# Patient Record
Sex: FEMALE | Race: WHITE | NOT HISPANIC OR LATINO | Employment: FULL TIME | URBAN - METROPOLITAN AREA
[De-identification: names, ages, dates, MRNs, and addresses within clinical notes are randomized per-mention and may not be internally consistent; named-entity substitution may affect disease eponyms.]

---

## 2019-08-02 ENCOUNTER — LAB (OUTPATIENT)
Dept: LAB | Facility: HOSPITAL | Age: 39
End: 2019-08-02

## 2019-08-02 ENCOUNTER — TRANSCRIBE ORDERS (OUTPATIENT)
Dept: LAB | Facility: HOSPITAL | Age: 39
End: 2019-08-02

## 2019-08-02 DIAGNOSIS — Z34.80 PRENATAL CARE OF MULTIGRAVIDA, ANTEPARTUM: ICD-10-CM

## 2019-08-02 DIAGNOSIS — Z34.81 PRENATAL CARE, SUBSEQUENT PREGNANCY, FIRST TRIMESTER: Primary | ICD-10-CM

## 2019-08-02 DIAGNOSIS — Z34.81 PRENATAL CARE, SUBSEQUENT PREGNANCY, FIRST TRIMESTER: ICD-10-CM

## 2019-08-02 LAB
ABO GROUP BLD: NORMAL
BASOPHILS # BLD AUTO: 0.03 10*3/MM3 (ref 0–0.2)
BASOPHILS NFR BLD AUTO: 0.4 % (ref 0–1.5)
BLD GP AB SCN SERPL QL: NEGATIVE
DEPRECATED RDW RBC AUTO: 42.6 FL (ref 37–54)
EOSINOPHIL # BLD AUTO: 0.04 10*3/MM3 (ref 0–0.4)
EOSINOPHIL NFR BLD AUTO: 0.5 % (ref 0.3–6.2)
ERYTHROCYTE [DISTWIDTH] IN BLOOD BY AUTOMATED COUNT: 12.9 % (ref 12.3–15.4)
GLUCOSE BLD-MCNC: 75 MG/DL (ref 65–99)
HBV SURFACE AG SERPL QL IA: NORMAL
HCT VFR BLD AUTO: 42.6 % (ref 34–46.6)
HCV AB SER DONR QL: NORMAL
HGB BLD-MCNC: 13.7 G/DL (ref 12–15.9)
HIV1+2 AB SER QL: NORMAL
IMM GRANULOCYTES # BLD AUTO: 0.02 10*3/MM3 (ref 0–0.05)
IMM GRANULOCYTES NFR BLD AUTO: 0.3 % (ref 0–0.5)
LYMPHOCYTES # BLD AUTO: 1.66 10*3/MM3 (ref 0.7–3.1)
LYMPHOCYTES NFR BLD AUTO: 21.4 % (ref 19.6–45.3)
MCH RBC QN AUTO: 29.2 PG (ref 26.6–33)
MCHC RBC AUTO-ENTMCNC: 32.2 G/DL (ref 31.5–35.7)
MCV RBC AUTO: 90.8 FL (ref 79–97)
MONOCYTES # BLD AUTO: 0.41 10*3/MM3 (ref 0.1–0.9)
MONOCYTES NFR BLD AUTO: 5.3 % (ref 5–12)
NEUTROPHILS # BLD AUTO: 5.59 10*3/MM3 (ref 1.7–7)
NEUTROPHILS NFR BLD AUTO: 72.1 % (ref 42.7–76)
NRBC BLD AUTO-RTO: 0 /100 WBC (ref 0–0.2)
PLATELET # BLD AUTO: 280 10*3/MM3 (ref 140–450)
PMV BLD AUTO: 10.8 FL (ref 6–12)
RBC # BLD AUTO: 4.69 10*6/MM3 (ref 3.77–5.28)
RH BLD: POSITIVE
RPR SER QL: NORMAL
TSH SERPL DL<=0.05 MIU/L-ACNC: 2.29 MIU/ML (ref 0.27–4.2)
WBC NRBC COR # BLD: 7.75 10*3/MM3 (ref 3.4–10.8)

## 2019-08-02 PROCEDURE — 86901 BLOOD TYPING SEROLOGIC RH(D): CPT

## 2019-08-02 PROCEDURE — 85025 COMPLETE CBC W/AUTO DIFF WBC: CPT

## 2019-08-02 PROCEDURE — 84443 ASSAY THYROID STIM HORMONE: CPT

## 2019-08-02 PROCEDURE — 80081 OBSTETRIC PANEL INC HIV TSTG: CPT

## 2019-08-02 PROCEDURE — 86803 HEPATITIS C AB TEST: CPT

## 2019-08-02 PROCEDURE — 82947 ASSAY GLUCOSE BLOOD QUANT: CPT

## 2019-08-02 PROCEDURE — 87340 HEPATITIS B SURFACE AG IA: CPT

## 2019-08-02 PROCEDURE — 86900 BLOOD TYPING SEROLOGIC ABO: CPT

## 2019-08-02 PROCEDURE — 36415 COLL VENOUS BLD VENIPUNCTURE: CPT

## 2019-08-02 PROCEDURE — G0432 EIA HIV-1/HIV-2 SCREEN: HCPCS

## 2019-08-02 PROCEDURE — 86850 RBC ANTIBODY SCREEN: CPT

## 2019-08-03 LAB — RUBV IGG SERPL IA-ACNC: POSITIVE

## 2019-12-23 ENCOUNTER — TRANSCRIBE ORDERS (OUTPATIENT)
Dept: LAB | Facility: HOSPITAL | Age: 39
End: 2019-12-23

## 2019-12-23 ENCOUNTER — LAB (OUTPATIENT)
Dept: LAB | Facility: HOSPITAL | Age: 39
End: 2019-12-23

## 2019-12-23 DIAGNOSIS — Z34.83 PRENATAL CARE, SUBSEQUENT PREGNANCY, THIRD TRIMESTER: ICD-10-CM

## 2019-12-23 DIAGNOSIS — Z3A.28 28 WEEKS GESTATION OF PREGNANCY: ICD-10-CM

## 2019-12-23 DIAGNOSIS — Z3A.28 28 WEEKS GESTATION OF PREGNANCY: Primary | ICD-10-CM

## 2019-12-23 LAB
BLD GP AB SCN SERPL QL: NEGATIVE
DEPRECATED RDW RBC AUTO: 39.1 FL (ref 37–54)
ERYTHROCYTE [DISTWIDTH] IN BLOOD BY AUTOMATED COUNT: 12.4 % (ref 12.3–15.4)
GLUCOSE 1H P 100 G GLC PO SERPL-MCNC: 91 MG/DL (ref 65–140)
HCT VFR BLD AUTO: 33.7 % (ref 34–46.6)
HGB BLD-MCNC: 11.3 G/DL (ref 12–15.9)
MCH RBC QN AUTO: 29.3 PG (ref 26.6–33)
MCHC RBC AUTO-ENTMCNC: 33.5 G/DL (ref 31.5–35.7)
MCV RBC AUTO: 87.3 FL (ref 79–97)
PLATELET # BLD AUTO: 231 10*3/MM3 (ref 140–450)
PMV BLD AUTO: 10.2 FL (ref 6–12)
RBC # BLD AUTO: 3.86 10*6/MM3 (ref 3.77–5.28)
WBC NRBC COR # BLD: 9.04 10*3/MM3 (ref 3.4–10.8)

## 2019-12-23 PROCEDURE — 85027 COMPLETE CBC AUTOMATED: CPT

## 2019-12-23 PROCEDURE — 86850 RBC ANTIBODY SCREEN: CPT

## 2019-12-23 PROCEDURE — 36415 COLL VENOUS BLD VENIPUNCTURE: CPT

## 2019-12-23 PROCEDURE — 82950 GLUCOSE TEST: CPT

## 2020-01-16 ENCOUNTER — HOSPITAL ENCOUNTER (OUTPATIENT)
Facility: HOSPITAL | Age: 40
Discharge: HOME | End: 2020-01-16
Attending: ADVANCED PRACTICE MIDWIFE | Admitting: OBSTETRICS & GYNECOLOGY

## 2020-01-16 VITALS
HEART RATE: 88 BPM | SYSTOLIC BLOOD PRESSURE: 126 MMHG | RESPIRATION RATE: 18 BRPM | DIASTOLIC BLOOD PRESSURE: 69 MMHG | WEIGHT: 227 LBS | BODY MASS INDEX: 32.5 KG/M2 | TEMPERATURE: 98.1 F | HEIGHT: 70 IN

## 2020-01-16 PROCEDURE — 99203 OFFICE O/P NEW LOW 30 MIN: CPT | Performed by: OBSTETRICS & GYNECOLOGY

## 2020-01-16 PROCEDURE — G0463 HOSPITAL OUTPT CLINIC VISIT: HCPCS

## 2020-01-16 PROCEDURE — 59025 FETAL NON-STRESS TEST: CPT
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
## 2020-03-12 ENCOUNTER — ANESTHESIA (OUTPATIENT)
Dept: LABOR AND DELIVERY | Facility: HOSPITAL | Age: 40
End: 2020-03-12

## 2020-03-12 ENCOUNTER — ANESTHESIA EVENT (OUTPATIENT)
Dept: LABOR AND DELIVERY | Facility: HOSPITAL | Age: 40
End: 2020-03-12

## 2020-03-12 ENCOUNTER — HOSPITAL ENCOUNTER (INPATIENT)
Facility: HOSPITAL | Age: 40
LOS: 2 days | Discharge: HOME | End: 2020-03-14
Attending: ADVANCED PRACTICE MIDWIFE | Admitting: OBSTETRICS & GYNECOLOGY

## 2020-03-12 PROBLEM — O36.8930 MECONIUM IN AMNIOTIC FLUID AFFECTING MANAGEMENT OF MOTHER IN THIRD TRIMESTER: Status: ACTIVE | Noted: 2020-03-12

## 2020-03-12 LAB
ABO GROUP BLD: NORMAL
BASOPHILS # BLD AUTO: 0.04 10*3/MM3 (ref 0–0.2)
BASOPHILS NFR BLD AUTO: 0.2 % (ref 0–1.5)
BLD GP AB SCN SERPL QL: NEGATIVE
DEPRECATED RDW RBC AUTO: 42.9 FL (ref 37–54)
EOSINOPHIL # BLD AUTO: 0.01 10*3/MM3 (ref 0–0.4)
EOSINOPHIL NFR BLD AUTO: 0 % (ref 0.3–6.2)
ERYTHROCYTE [DISTWIDTH] IN BLOOD BY AUTOMATED COUNT: 13.6 % (ref 12.3–15.4)
HCT VFR BLD AUTO: 37.1 % (ref 34–46.6)
HGB BLD-MCNC: 12.2 G/DL (ref 12–15.9)
IMM GRANULOCYTES # BLD AUTO: 0.12 10*3/MM3 (ref 0–0.05)
IMM GRANULOCYTES NFR BLD AUTO: 0.6 % (ref 0–0.5)
LYMPHOCYTES # BLD AUTO: 1.64 10*3/MM3 (ref 0.7–3.1)
LYMPHOCYTES NFR BLD AUTO: 7.9 % (ref 19.6–45.3)
MCH RBC QN AUTO: 28.8 PG (ref 26.6–33)
MCHC RBC AUTO-ENTMCNC: 32.9 G/DL (ref 31.5–35.7)
MCV RBC AUTO: 87.7 FL (ref 79–97)
MONOCYTES # BLD AUTO: 1.13 10*3/MM3 (ref 0.1–0.9)
MONOCYTES NFR BLD AUTO: 5.5 % (ref 5–12)
NEUTROPHILS # BLD AUTO: 17.71 10*3/MM3 (ref 1.7–7)
NEUTROPHILS NFR BLD AUTO: 85.8 % (ref 42.7–76)
NRBC BLD AUTO-RTO: 0 /100 WBC (ref 0–0.2)
PLATELET # BLD AUTO: 244 10*3/MM3 (ref 140–450)
PMV BLD AUTO: 10.9 FL (ref 6–12)
RBC # BLD AUTO: 4.23 10*6/MM3 (ref 3.77–5.28)
RH BLD: POSITIVE
T&S EXPIRATION DATE: NORMAL
WBC NRBC COR # BLD: 20.65 10*3/MM3 (ref 3.4–10.8)

## 2020-03-12 PROCEDURE — 25010000002 ROPIVACAINE PER 1 MG: Performed by: ANESTHESIOLOGY

## 2020-03-12 PROCEDURE — C1755 CATHETER, INTRASPINAL: HCPCS | Performed by: ANESTHESIOLOGY

## 2020-03-12 PROCEDURE — 0KQM0ZZ REPAIR PERINEUM MUSCLE, OPEN APPROACH: ICD-10-PCS | Performed by: ADVANCED PRACTICE MIDWIFE

## 2020-03-12 PROCEDURE — 25010000002 FENTANYL CITRATE (PF) 100 MCG/2ML SOLUTION: Performed by: ANESTHESIOLOGY

## 2020-03-12 PROCEDURE — 25010000002 ONDANSETRON PER 1 MG: Performed by: OBSTETRICS & GYNECOLOGY

## 2020-03-12 PROCEDURE — 86850 RBC ANTIBODY SCREEN: CPT | Performed by: OBSTETRICS & GYNECOLOGY

## 2020-03-12 PROCEDURE — 36415 COLL VENOUS BLD VENIPUNCTURE: CPT | Performed by: OBSTETRICS & GYNECOLOGY

## 2020-03-12 PROCEDURE — C1755 CATHETER, INTRASPINAL: HCPCS

## 2020-03-12 PROCEDURE — 85025 COMPLETE CBC W/AUTO DIFF WBC: CPT | Performed by: OBSTETRICS & GYNECOLOGY

## 2020-03-12 PROCEDURE — 86900 BLOOD TYPING SEROLOGIC ABO: CPT | Performed by: OBSTETRICS & GYNECOLOGY

## 2020-03-12 PROCEDURE — 59025 FETAL NON-STRESS TEST: CPT

## 2020-03-12 PROCEDURE — 86901 BLOOD TYPING SEROLOGIC RH(D): CPT | Performed by: OBSTETRICS & GYNECOLOGY

## 2020-03-12 RX ORDER — OXYTOCIN 10 [USP'U]/ML
10 INJECTION, SOLUTION INTRAMUSCULAR; INTRAVENOUS ONCE
Status: COMPLETED | OUTPATIENT
Start: 2020-03-12 | End: 2020-03-12

## 2020-03-12 RX ORDER — LIDOCAINE HYDROCHLORIDE AND EPINEPHRINE BITARTRATE 20; .01 MG/ML; MG/ML
INJECTION, SOLUTION SUBCUTANEOUS AS NEEDED
Status: DISCONTINUED | OUTPATIENT
Start: 2020-03-12 | End: 2020-03-12 | Stop reason: SURG

## 2020-03-12 RX ORDER — DOCUSATE SODIUM 100 MG/1
100 CAPSULE, LIQUID FILLED ORAL 2 TIMES DAILY PRN
Status: DISCONTINUED | OUTPATIENT
Start: 2020-03-12 | End: 2020-03-14 | Stop reason: HOSPADM

## 2020-03-12 RX ORDER — SODIUM CHLORIDE 0.9 % (FLUSH) 0.9 %
10 SYRINGE (ML) INJECTION AS NEEDED
Status: DISCONTINUED | OUTPATIENT
Start: 2020-03-12 | End: 2020-03-12 | Stop reason: HOSPADM

## 2020-03-12 RX ORDER — PROMETHAZINE HYDROCHLORIDE 25 MG/ML
12.5 INJECTION, SOLUTION INTRAMUSCULAR; INTRAVENOUS EVERY 6 HOURS PRN
Status: DISCONTINUED | OUTPATIENT
Start: 2020-03-12 | End: 2020-03-12 | Stop reason: HOSPADM

## 2020-03-12 RX ORDER — MAGNESIUM CARB/ALUMINUM HYDROX 105-160MG
30 TABLET,CHEWABLE ORAL ONCE
Status: DISCONTINUED | OUTPATIENT
Start: 2020-03-12 | End: 2020-03-12 | Stop reason: HOSPADM

## 2020-03-12 RX ORDER — ACETAMINOPHEN 325 MG/1
650 TABLET ORAL EVERY 4 HOURS PRN
Status: DISCONTINUED | OUTPATIENT
Start: 2020-03-12 | End: 2020-03-12 | Stop reason: HOSPADM

## 2020-03-12 RX ORDER — SODIUM CHLORIDE 0.9 % (FLUSH) 0.9 %
3 SYRINGE (ML) INJECTION EVERY 12 HOURS SCHEDULED
Status: DISCONTINUED | OUTPATIENT
Start: 2020-03-12 | End: 2020-03-12 | Stop reason: HOSPADM

## 2020-03-12 RX ORDER — OXYTOCIN-SODIUM CHLORIDE 0.9% IV SOLN 30 UNIT/500ML 30-0.9/5 UT/ML-%
85 SOLUTION INTRAVENOUS ONCE
Status: DISCONTINUED | OUTPATIENT
Start: 2020-03-12 | End: 2020-03-12 | Stop reason: HOSPADM

## 2020-03-12 RX ORDER — ONDANSETRON 2 MG/ML
4 INJECTION INTRAMUSCULAR; INTRAVENOUS EVERY 6 HOURS PRN
Status: DISCONTINUED | OUTPATIENT
Start: 2020-03-12 | End: 2020-03-14 | Stop reason: HOSPADM

## 2020-03-12 RX ORDER — MORPHINE SULFATE 2 MG/ML
2 INJECTION, SOLUTION INTRAMUSCULAR; INTRAVENOUS
Status: DISCONTINUED | OUTPATIENT
Start: 2020-03-12 | End: 2020-03-12 | Stop reason: HOSPADM

## 2020-03-12 RX ORDER — DIPHENHYDRAMINE HYDROCHLORIDE 50 MG/ML
12.5 INJECTION INTRAMUSCULAR; INTRAVENOUS EVERY 8 HOURS PRN
Status: DISCONTINUED | OUTPATIENT
Start: 2020-03-12 | End: 2020-03-12 | Stop reason: HOSPADM

## 2020-03-12 RX ORDER — PROMETHAZINE HYDROCHLORIDE 12.5 MG/1
12.5 TABLET ORAL EVERY 6 HOURS PRN
Status: DISCONTINUED | OUTPATIENT
Start: 2020-03-12 | End: 2020-03-12 | Stop reason: HOSPADM

## 2020-03-12 RX ORDER — ROPIVACAINE HYDROCHLORIDE 5 MG/ML
INJECTION, SOLUTION EPIDURAL; INFILTRATION; PERINEURAL AS NEEDED
Status: DISCONTINUED | OUTPATIENT
Start: 2020-03-12 | End: 2020-03-12 | Stop reason: SURG

## 2020-03-12 RX ORDER — LANOLIN
CREAM (ML) TOPICAL
Status: DISCONTINUED | OUTPATIENT
Start: 2020-03-12 | End: 2020-03-14 | Stop reason: HOSPADM

## 2020-03-12 RX ORDER — FAMOTIDINE 10 MG/ML
20 INJECTION, SOLUTION INTRAVENOUS ONCE AS NEEDED
Status: DISCONTINUED | OUTPATIENT
Start: 2020-03-12 | End: 2020-03-12 | Stop reason: HOSPADM

## 2020-03-12 RX ORDER — SODIUM CHLORIDE 0.9 % (FLUSH) 0.9 %
1-10 SYRINGE (ML) INJECTION AS NEEDED
Status: DISCONTINUED | OUTPATIENT
Start: 2020-03-12 | End: 2020-03-14 | Stop reason: HOSPADM

## 2020-03-12 RX ORDER — PROMETHAZINE HYDROCHLORIDE 12.5 MG/1
12.5 SUPPOSITORY RECTAL EVERY 6 HOURS PRN
Status: DISCONTINUED | OUTPATIENT
Start: 2020-03-12 | End: 2020-03-12 | Stop reason: HOSPADM

## 2020-03-12 RX ORDER — OXYCODONE HYDROCHLORIDE AND ACETAMINOPHEN 5; 325 MG/1; MG/1
2 TABLET ORAL EVERY 4 HOURS PRN
Status: DISCONTINUED | OUTPATIENT
Start: 2020-03-12 | End: 2020-03-12 | Stop reason: HOSPADM

## 2020-03-12 RX ORDER — OXYTOCIN-SODIUM CHLORIDE 0.9% IV SOLN 30 UNIT/500ML 30-0.9/5 UT/ML-%
650 SOLUTION INTRAVENOUS ONCE
Status: DISCONTINUED | OUTPATIENT
Start: 2020-03-12 | End: 2020-03-12 | Stop reason: HOSPADM

## 2020-03-12 RX ORDER — FENTANYL CITRATE 50 UG/ML
INJECTION, SOLUTION INTRAMUSCULAR; INTRAVENOUS AS NEEDED
Status: DISCONTINUED | OUTPATIENT
Start: 2020-03-12 | End: 2020-03-12 | Stop reason: SURG

## 2020-03-12 RX ORDER — LIDOCAINE HYDROCHLORIDE AND EPINEPHRINE 15; 5 MG/ML; UG/ML
INJECTION, SOLUTION EPIDURAL AS NEEDED
Status: DISCONTINUED | OUTPATIENT
Start: 2020-03-12 | End: 2020-03-12 | Stop reason: SURG

## 2020-03-12 RX ORDER — ONDANSETRON 2 MG/ML
4 INJECTION INTRAMUSCULAR; INTRAVENOUS ONCE AS NEEDED
Status: DISCONTINUED | OUTPATIENT
Start: 2020-03-12 | End: 2020-03-12 | Stop reason: HOSPADM

## 2020-03-12 RX ORDER — ROPIVACAINE HYDROCHLORIDE 2 MG/ML
15 INJECTION, SOLUTION EPIDURAL; INFILTRATION; PERINEURAL CONTINUOUS
Status: DISCONTINUED | OUTPATIENT
Start: 2020-03-12 | End: 2020-03-14 | Stop reason: HOSPADM

## 2020-03-12 RX ORDER — FAMOTIDINE 20 MG/1
20 TABLET, FILM COATED ORAL 2 TIMES DAILY PRN
Status: DISCONTINUED | OUTPATIENT
Start: 2020-03-12 | End: 2020-03-14 | Stop reason: HOSPADM

## 2020-03-12 RX ORDER — METOCLOPRAMIDE HYDROCHLORIDE 5 MG/ML
10 INJECTION INTRAMUSCULAR; INTRAVENOUS ONCE AS NEEDED
Status: DISCONTINUED | OUTPATIENT
Start: 2020-03-12 | End: 2020-03-12

## 2020-03-12 RX ORDER — IBUPROFEN 600 MG/1
600 TABLET ORAL EVERY 6 HOURS PRN
Status: DISCONTINUED | OUTPATIENT
Start: 2020-03-12 | End: 2020-03-14 | Stop reason: HOSPADM

## 2020-03-12 RX ORDER — IBUPROFEN 600 MG/1
600 TABLET ORAL EVERY 6 HOURS PRN
Status: DISCONTINUED | OUTPATIENT
Start: 2020-03-12 | End: 2020-03-12 | Stop reason: HOSPADM

## 2020-03-12 RX ORDER — SODIUM CHLORIDE, SODIUM LACTATE, POTASSIUM CHLORIDE, CALCIUM CHLORIDE 600; 310; 30; 20 MG/100ML; MG/100ML; MG/100ML; MG/100ML
125 INJECTION, SOLUTION INTRAVENOUS CONTINUOUS
Status: DISCONTINUED | OUTPATIENT
Start: 2020-03-12 | End: 2020-03-14 | Stop reason: HOSPADM

## 2020-03-12 RX ORDER — TRISODIUM CITRATE DIHYDRATE AND CITRIC ACID MONOHYDRATE 500; 334 MG/5ML; MG/5ML
30 SOLUTION ORAL ONCE
Status: DISCONTINUED | OUTPATIENT
Start: 2020-03-12 | End: 2020-03-12 | Stop reason: HOSPADM

## 2020-03-12 RX ORDER — PRENATAL WITH FERROUS FUM AND FOLIC ACID 3080; 920; 120; 400; 22; 1.84; 3; 20; 10; 1; 12; 200; 27; 25; 2 [IU]/1; [IU]/1; MG/1; [IU]/1; MG/1; MG/1; MG/1; MG/1; MG/1; MG/1; UG/1; MG/1; MG/1; MG/1; MG/1
TABLET ORAL DAILY
COMMUNITY
End: 2020-10-11

## 2020-03-12 RX ORDER — EPHEDRINE SULFATE/0.9% NACL/PF 25 MG/5 ML
5 SYRINGE (ML) INTRAVENOUS
Status: DISCONTINUED | OUTPATIENT
Start: 2020-03-12 | End: 2020-03-12 | Stop reason: HOSPADM

## 2020-03-12 RX ORDER — PRENATAL VIT/IRON FUM/FOLIC AC 27MG-0.8MG
1 TABLET ORAL DAILY
Status: DISCONTINUED | OUTPATIENT
Start: 2020-03-12 | End: 2020-03-14 | Stop reason: HOSPADM

## 2020-03-12 RX ADMIN — LIDOCAINE HYDROCHLORIDE AND EPINEPHRINE 3 ML: 15; 5 INJECTION, SOLUTION EPIDURAL at 03:39

## 2020-03-12 RX ADMIN — Medication: at 11:14

## 2020-03-12 RX ADMIN — IBUPROFEN 600 MG: 600 TABLET, FILM COATED ORAL at 11:14

## 2020-03-12 RX ADMIN — WITCH HAZEL 1 PAD: 500 SOLUTION RECTAL; TOPICAL at 11:14

## 2020-03-12 RX ADMIN — PRENATAL VIT W/ FE FUMARATE-FA TAB 27-0.8 MG 1 TABLET: 27-0.8 TAB at 11:14

## 2020-03-12 RX ADMIN — ROPIVACAINE HYDROCHLORIDE 16 ML/HR: 2 INJECTION, SOLUTION EPIDURAL; INFILTRATION at 03:39

## 2020-03-12 RX ADMIN — DOCUSATE SODIUM 100 MG: 100 CAPSULE, LIQUID FILLED ORAL at 22:05

## 2020-03-12 RX ADMIN — LIDOCAINE HYDROCHLORIDE AND EPINEPHRINE 3 ML: 15; 5 INJECTION, SOLUTION EPIDURAL at 06:01

## 2020-03-12 RX ADMIN — OXYTOCIN 10 UNITS: 10 INJECTION, SOLUTION INTRAMUSCULAR; INTRAVENOUS at 08:44

## 2020-03-12 RX ADMIN — LIDOCAINE HYDROCHLORIDE,EPINEPHRINE BITARTRATE 10 ML: 20; .01 INJECTION, SOLUTION INFILTRATION; PERINEURAL at 05:17

## 2020-03-12 RX ADMIN — ROPIVACAINE HYDROCHLORIDE 10 ML: 5 INJECTION, SOLUTION EPIDURAL; INFILTRATION; PERINEURAL at 03:39

## 2020-03-12 RX ADMIN — FAMOTIDINE 20 MG: 20 TABLET ORAL at 06:57

## 2020-03-12 RX ADMIN — ONDANSETRON 4 MG: 2 INJECTION INTRAMUSCULAR; INTRAVENOUS at 03:11

## 2020-03-12 RX ADMIN — IBUPROFEN 600 MG: 600 TABLET, FILM COATED ORAL at 17:58

## 2020-03-12 RX ADMIN — FENTANYL CITRATE 100 MCG: 50 INJECTION, SOLUTION INTRAMUSCULAR; INTRAVENOUS at 03:39

## 2020-03-12 RX ADMIN — SODIUM CHLORIDE, POTASSIUM CHLORIDE, SODIUM LACTATE AND CALCIUM CHLORIDE 125 ML/HR: 600; 310; 30; 20 INJECTION, SOLUTION INTRAVENOUS at 04:21

## 2020-03-12 RX ADMIN — ROPIVACAINE HYDROCHLORIDE 10 ML: 5 INJECTION, SOLUTION EPIDURAL; INFILTRATION; PERINEURAL at 06:01

## 2020-03-12 NOTE — H&P
[facility]  Obstetric History and Physical    Chief Complaint   Patient presents with   • Contractions       Subjective     Patient is a 39 y.o. female  currently at 40w2d, who presents for term labor  with ROM.    Her prenatal care is complicated by  advanced maternal age  genetic screening was declined.  Her previous obstetric/gynecological history is non-contributory.    The following portions of the patients history were reviewed and updated as appropriate: current medications, allergies, past medical history, past surgical history, past family history, past social history and problem list .       Prenatal Information:   Maternal Prenatal Labs  Blood Type ABO Type   Date Value Ref Range Status   2020 A  Final      Rh Status RH type   Date Value Ref Range Status   2020 Positive  Final      Antibody Screen Antibody Screen   Date Value Ref Range Status   2020 Negative  Final      Gonnorhea No results found for: GCCX   Chlamydia No results found for: CLAMYDCU   RPR No results found for: RPR   Syphilis Antibody No results found for: SYPHILIS   Rubella No results found for: RUBELLAIGGIN   Hepatitis B Surface Antigen No results found for: HEPBSAG   HIV-1 Antibody No results found for: LABHIV1   Hepatitis C Antibody No results found for: HEPCAB   Rapid Urin Drug Screen No results found for: AMPMETHU, BARBITSCNUR, LABBENZSCN, LABMETHSCN, LABOPIASCN, THCURSCR, COCAINEUR, AMPHETSCREEN, PROPOXSCN, BUPRENORSCNU, METAMPSCNUR, OXYCODONESCN, TRICYCLICSCN   Group B Strep Culture Negative                 Past OB History:       OB History    Para Term  AB Living   1 1 1 0 0 1   SAB TAB Ectopic Molar Multiple Live Births   0 0 0 0 0 1      # Outcome Date GA Lbr Dennys/2nd Weight Sex Delivery Anes PTL Lv   1 Term 20 40w2d  3265 g (7 lb 3.2 oz) F Vag-Forceps EPI N ISIDORO      Complications: Fetal Intolerance      Name: MONCHO GALLARDO      Apgar1: 8  Apgar5: 9       Past Medical History:  History reviewed. No pertinent past medical history.   Past Surgical History Past Surgical History:   Procedure Laterality Date   • CERVICAL BIOPSY  W/ LOOP ELECTRODE EXCISION     • WISDOM TOOTH EXTRACTION        Family History: Family History   Problem Relation Age of Onset   • Lung cancer Father    • Lymphoma Mother       Social History:  reports that she has never smoked. She has never used smokeless tobacco.   reports that she drank alcohol.   reports that she does not use drugs.        REVIEW OF SYSTEMS             Reports fetal movement is normal             Reports leakage of amniotic fluid.             Denies vaginal bleeding             She reports Regular contractions, frequency: Every 4-5 minutes, intensity moderate             All other systems reviewed and are negative    Objective       Vital Signs Range for the last 24 hours  Temperature: Temp:  [97.5 °F (36.4 °C)-98.9 °F (37.2 °C)] 98.2 °F (36.8 °C)   Temp Source: Temp src: Oral   BP: BP: (114-144)/(58-90) 123/78   Pulse: Heart Rate:  [100-148] 102   Respirations: Resp:  [16-18] 18   SPO2:     O2 Amount (l/min):     O2 Devices     Weight: Weight:  [107 kg (236 lb)] 107 kg (236 lb)       Presentation: vertex   Cervix: Exam by:  RN (on admission)   Dilation:  1-2   Effacement:  70   Station:  -2       Fetal Heart Rate Assessment   Method:  external   Beats/min:     Baseline:  150   Varibility:  moderate   Accels:  absent   Decels:  variables   Tracing Category:  Category 2    NST: NON-REACTIVE      Uterine Assessment   Method:  external   Frequency (min):  2-3 minutes   Ctx Count in 10 min:     Duration:  60-70 seconds   Intensity:  moderate   Intensity by IUPC:     Resting Tone:  abd soft by palpation   Resting Tone by IUPC:     [place] Units:             Constitutional:  Well developed, well nourished, no acute distress, well-groomed.   Respiratory:  Resp e/e/u, normal breath sounds.   Cardiovascular:  Normal rate and rhythm, no murmurs.    Gastrointestinal:  Soft, gravid, nontender.  Uterus: Soft, nontender. Fundus appropriate for dates.  Neurologic:  Alert & oriented x 3,  no focal deficits noted.   Psychiatric:  Speech and behavior appropriate.   Extremities: no cyanosis, clubbing or edema, no evidence of DVT.        Labs:  Lab Results   Component Value Date    WBC 20.65 (H) 03/12/2020    HGB 12.2 03/12/2020    HCT 37.1 03/12/2020    MCV 87.7 03/12/2020     03/12/2020     Results from last 7 days   Lab Units 03/12/20  0233   ABO TYPING  A   RH TYPING  Positive   ANTIBODY SCREEN  Negative           Assessment/Plan       Meconium in amniotic fluid affecting management of mother in third trimester    Forceps delivery        Assessment:  1.  Intrauterine pregnancy at 40w2d weeks gestation with nonreactive, variable decelerations present fetal status.    2.   term labor  with ROM  3.  Obstetrical history significant for is non-contributory.  4.  GBS status: Negative    Plan:  1. Expectant management  2. Plan of care has been reviewed with patient and questions answered.  3.  Risks, benefits of treatment plan have been discussed.  4.  All questions have been answered.        Alcie Johnson CNM  3/12/2020  19:14

## 2020-03-12 NOTE — ANESTHESIA PROCEDURE NOTES
Labor Epidural      Patient reassessed immediately prior to procedure    Patient location during procedure: OB  Start Time: 3/12/2020 6:00 AM  Stop Time: 3/12/2020 6:07 AM  Performed By  Anesthesiologist: Donavan Lizama MD  Preanesthetic Checklist  Completed: patient identified, site marked, surgical consent, pre-op evaluation, timeout performed, risks and benefits discussed and monitors and equipment checked  Prep:  Pt Position:sitting  Sterile Tech:cap, gloves, mask and sterile barrier  Prep:alcohol swabs  Monitoring:blood pressure monitoring  Epidural Block Procedure:  Approach:midline  Guidance:landmark technique  Location:L4-L5  Needle Type:Tuohy  Needle Gauge:17 G  Loss of Resistance Medium: air  Loss of Resistance: 5cm  Cath Depth at skin:15 cm  Paresthesia: none  Aspiration:negative  Test Dose:negative  Number of Attempts: 1  Post Assessment:  Dressing:occlusive dressing applied and secured with tape  Pt Tolerance:patient tolerated the procedure well with no apparent complications  Complications:no

## 2020-03-12 NOTE — PROCEDURES
DUC Vera  Tsering Kerr  : 1980  MRN: [id number]  [facility]: [id number]    Pre-operative forceps/vacuum note    I have personally discussed operative vaginal delivery with Tsering ellen Best  I discussed the use of forceps to assist in the delivery.    Risks related to operative vaginal delivery that were discussed include but not limited to blood transfusion, fetal injury, shoulder dystocia and vaginal laceration.    Pre-op indications: 1. Non reassuring fetal heart rate tracing      Pre-procedure assessment:  Estimated fetal weight: 7#0oz - 7#8oz  Station of presenting part: +2   Position of presenting part: occiput anterior (OA)  Assessment of pelvis: adequate   Adequate anesthesia for operative delivery?: yes  Has maternal bladder been emptied recently?: Yes      Over one contraction with maternal effort, the infant was brought down from +2 to .  The forceps were removed at that point to reduce the risk of maternal trauma.  She then pushed over another contraction, but was not able to move the infant.  Ritgen's maneuver was performed with success.       RONEY Johnson was present and completed the delivery at that point     Carmine Cooper MD  20  08:19

## 2020-03-12 NOTE — PLAN OF CARE
Problem: Breastfeeding (Adult,Obstetrics,Pediatric)  Goal: Signs and Symptoms of Listed Potential Problems Will be Absent, Minimized or Managed (Breastfeeding)  Outcome: Ongoing (interventions implemented as appropriate)  Flowsheets (Taken 3/12/2020 1645)  Problems Assessed (Breastfeeding): all  Problems Present (Breastfeeding): none     Problem: Breastfeeding (Adult,Obstetrics,Pediatric)  Intervention: Promote Breast Care and Comfort  Flowsheets (Taken 3/12/2020 1645)  Breast Care: Breastfeeding: frequency of feeding adjusted  Intervention: Provide Support During Feeding Sessions  Flowsheets (Taken 3/12/2020 1645)  Parent/Child Attachment Promotion: skin-to-skin contact encouraged; rooming-in promoted; positive reinforcement provided  Intervention: Promote Positive Maternal Experience  Flowsheets (Taken 3/12/2020 1645)  Supportive Measures: positive reinforcement provided  Breastfeeding Support: lactation counseling provided  Intervention: Support Exclusive Breastfeeding Success  Flowsheets (Taken 3/12/2020 1645)  Breastfeeding Assistance: feeding on demand promoted; support offered

## 2020-03-12 NOTE — ANESTHESIA PROCEDURE NOTES
Labor Epidural      Patient location during procedure: OB  Start Time: 3/12/2020 6:00 AM  Stop Time: 3/12/2020 6:07 AM  Performed By  Anesthesiologist: Donavan Lizama MD  Preanesthetic Checklist  Completed: patient identified, site marked, surgical consent, pre-op evaluation, timeout performed, risks and benefits discussed and monitors and equipment checked  Additional Notes  Left side was not numb enough catheter replaced  Prep:  Pt Position:sitting  Sterile Tech:cap, gloves, mask and sterile barrier  Prep:alcohol swabs  Monitoring:blood pressure monitoring  Epidural Block Procedure:  Approach:midline  Guidance:landmark technique  Location:L4-L5  Needle Type:Tuohy  Needle Gauge:17 G  Loss of Resistance Medium: air  Loss of Resistance: 5cm  Cath Depth at skin:15 cm  Paresthesia: none  Aspiration:negative  Test Dose:negative  Number of Attempts: 1  Post Assessment:  Dressing:occlusive dressing applied and secured with tape  Pt Tolerance:patient tolerated the procedure well with no apparent complications  Complications:no

## 2020-03-12 NOTE — L&D DELIVERY NOTE
[facility]  Vaginal Delivery Note    Delivery     Delivery: Vaginal, Forceps     YOB: 2018    Time of Birth:  Gestational Age 8:00 AM   40w2d     Anesthesia: Epidural     Delivering clinician: Alice Johnson    Forceps?   Yes  Forcep Delivery   Forceps type:      Application location: Low    Number of pulls:      Total application time:      Applied by: NGUYEN Ross? No       Vacuum? No    Shoulder dystocia present: No        Delivery narrative:  Patient at 40w2d pushed to deliver a viable female infant over a second degree laceration with forceps assist by Dr. Cooper with epidural anesthesia. Infant's head delivered under pubic bone with low outlet forceps by Dr. Cooper. After infant's head delivered atraumatically, CNM assumed delivery of anterior shoulder and body. Vigorous infant was placed on mom's abdomen where the cord was allowed to stop pulsating and was clamped x2 and cut by FOB at 2.5 minutes. Infant was assessed at the warmer by the DRT. Meconium stained placenta delivered spontaneously and appeared to be intact. The perineum was inspected and bilateral labial lacerations were found. All lacerations repaired with 2-0 Vicryl. Boggy uterus became firm with fundal massage, IV oxytocin, and IM Methergine.  ml. Mother and infant stable, bonding.         Infant    Findings: female  infant     Infant observations: Weight: 3265 g (7 lb 3.2 oz)   Length: 19.5  in  Observations/Comments:         Apgars: 8   @ 1 minute /    9   @ 5 minutes   Infant Name: Orin Jim     Placenta, Cord, and Fluid    Placenta delivered  Spontaneous  at   3/12/2020  8:06 AM     Cord: 3 vessels  present.   Nuchal Cord?  no   Cord blood obtained: Yes    Cord gases obtained:  Yes    Cord gas results: Venous:  No results found for: PHCVEN    Arterial:  No results found for: PHCART     Repair    Episiotomy: None        Lacerations: Yes  Laceration Information  Laceration Repaired?   Perineal: 2nd  Yes     Periurethral:         Labial: right , left Yes    Sulcus:         Vaginal:         Cervical:           Suture used for repair: 2-0 Vicryl   Estimated Blood Loss:  400 ml           Complications  Forceps assist due to fetal heart rate decelerations    Disposition  Mother to Mother Baby/Postpartum  in stable condition currently.  Baby to remains with mom  in stable condition currently.      Alice Johnson CNM  03/12/20  08:33

## 2020-03-12 NOTE — LACTATION NOTE
03/12/20 1645   Maternal Information   Date of Referral 03/12/20   Person Making Referral other (see comments)  (courtesy)   Maternal Infant Feeding   Maternal Emotional State independent;relaxed   Infant Positioning cradle   Pain with Feeding no   Latch Assistance no   Equipment Type   Breast Pump Type double electric, personal   Reproductive Interventions   Breast Care: Breastfeeding frequency of feeding adjusted   Breastfeeding Assistance feeding on demand promoted;support offered   Breastfeeding Support lactation counseling provided   Coping/Psychosocial Interventions   Parent/Child Attachment Promotion skin-to-skin contact encouraged;rooming-in promoted;positive reinforcement provided   Supportive Measures positive reinforcement provided     Baby has been nursing well. Latched currently to right side in cradle. Good latch with good suck, and intermittent swallow. Enc skin to skin. Enc to observe for feeding cues, and to awaken every 3 hrs if not demanding. Enc to call for asst if needed.

## 2020-03-12 NOTE — ANESTHESIA PROCEDURE NOTES
Labor Epidural      Patient reassessed immediately prior to procedure    Patient location during procedure: OB  Start Time: 3/12/2020 3:18 AM  Stop Time: 3/12/2020 3:38 AM  Performed By  Anesthesiologist: Donavan Lizama MD  Preanesthetic Checklist  Completed: patient identified, site marked, surgical consent, pre-op evaluation, timeout performed, risks and benefits discussed and monitors and equipment checked  Prep:  Pt Position:sitting  Sterile Tech:cap, gloves, mask and sterile barrier  Prep:alcohol swabs  Monitoring:blood pressure monitoring  Epidural Block Procedure:  Approach:midline  Guidance:landmark technique  Location:L3-L4  Needle Type:Tuohy  Needle Gauge:17 G  Loss of Resistance Medium: air  Loss of Resistance: 6cm  Cath Depth at skin:15 cm  Paresthesia: none  Aspiration:negative  Test Dose:negative  Number of Attempts: 2  Post Assessment:  Dressing:occlusive dressing applied and secured with tape  Pt Tolerance:patient tolerated the procedure well with no apparent complications  Complications:no

## 2020-03-13 LAB
BASOPHILS # BLD AUTO: 0.06 10*3/MM3 (ref 0–0.2)
BASOPHILS NFR BLD AUTO: 0.3 % (ref 0–1.5)
DEPRECATED RDW RBC AUTO: 46.3 FL (ref 37–54)
EOSINOPHIL # BLD AUTO: 0.08 10*3/MM3 (ref 0–0.4)
EOSINOPHIL NFR BLD AUTO: 0.4 % (ref 0.3–6.2)
ERYTHROCYTE [DISTWIDTH] IN BLOOD BY AUTOMATED COUNT: 14 % (ref 12.3–15.4)
HCT VFR BLD AUTO: 33.5 % (ref 34–46.6)
HGB BLD-MCNC: 10.5 G/DL (ref 12–15.9)
IMM GRANULOCYTES # BLD AUTO: 0.15 10*3/MM3 (ref 0–0.05)
IMM GRANULOCYTES NFR BLD AUTO: 0.7 % (ref 0–0.5)
LYMPHOCYTES # BLD AUTO: 3.23 10*3/MM3 (ref 0.7–3.1)
LYMPHOCYTES NFR BLD AUTO: 15.7 % (ref 19.6–45.3)
MCH RBC QN AUTO: 28.4 PG (ref 26.6–33)
MCHC RBC AUTO-ENTMCNC: 31.3 G/DL (ref 31.5–35.7)
MCV RBC AUTO: 90.5 FL (ref 79–97)
MONOCYTES # BLD AUTO: 1.04 10*3/MM3 (ref 0.1–0.9)
MONOCYTES NFR BLD AUTO: 5 % (ref 5–12)
NEUTROPHILS # BLD AUTO: 16.05 10*3/MM3 (ref 1.7–7)
NEUTROPHILS NFR BLD AUTO: 77.9 % (ref 42.7–76)
NRBC BLD AUTO-RTO: 0 /100 WBC (ref 0–0.2)
PLATELET # BLD AUTO: 257 10*3/MM3 (ref 140–450)
PMV BLD AUTO: 11.4 FL (ref 6–12)
RBC # BLD AUTO: 3.7 10*6/MM3 (ref 3.77–5.28)
WBC NRBC COR # BLD: 20.61 10*3/MM3 (ref 3.4–10.8)

## 2020-03-13 PROCEDURE — 85025 COMPLETE CBC W/AUTO DIFF WBC: CPT | Performed by: ADVANCED PRACTICE MIDWIFE

## 2020-03-13 RX ADMIN — IBUPROFEN 600 MG: 600 TABLET, FILM COATED ORAL at 01:06

## 2020-03-13 RX ADMIN — IBUPROFEN 600 MG: 600 TABLET, FILM COATED ORAL at 09:16

## 2020-03-13 RX ADMIN — IBUPROFEN 600 MG: 600 TABLET, FILM COATED ORAL at 17:08

## 2020-03-13 RX ADMIN — PRENATAL VIT W/ FE FUMARATE-FA TAB 27-0.8 MG 1 TABLET: 27-0.8 TAB at 09:11

## 2020-03-13 RX ADMIN — DOCUSATE SODIUM 100 MG: 100 CAPSULE, LIQUID FILLED ORAL at 09:11

## 2020-03-13 NOTE — PROGRESS NOTES
[facility]  Vaginal Delivery Progress Note    Subjective     Doing well, pain controlled, lochia less than menses      Objective     Vital Signs Range for the last 24 hours  Temperature: Temp:  [97.5 °F (36.4 °C)-98.6 °F (37 °C)] 97.7 °F (36.5 °C)   Temp Source: Temp src: Oral   BP: BP: (102-132)/(59-82) 102/59   Pulse: Heart Rate:  [] 89   Respirations: Resp:  [16-18] 16   SPO2:     O2 Amount (l/min):     O2 Devices           Physical Exam:  General:  no acute distresss.  Abdomen: Soft, non-tender, fundus firm  Extremities: normal, atraumatic, no cyanosis, and trace edema.       Lab results reviewed:  Yes    Lab Results   Component Value Date    WBC 20.65 (H) 03/12/2020    HGB 12.2 03/12/2020    HCT 37.1 03/12/2020    MCV 87.7 03/12/2020     03/12/2020         Assessment/Plan       Meconium in amniotic fluid affecting management of mother in third trimester    Forceps delivery      Tsering Kerr is Day 1  post-partum       Plan:  Continue current care.      Mary Cedeno CNM  3/13/2020  09:06

## 2020-03-13 NOTE — PLAN OF CARE
Problem: Patient Care Overview  Goal: Plan of Care Review  Outcome: Ongoing (interventions implemented as appropriate)  Flowsheets (Taken 3/13/2020 6135)  Outcome Summary: vss. light bleeding. received a colace. up adlib. voided with no problems.

## 2020-03-13 NOTE — ANESTHESIA POSTPROCEDURE EVALUATION
Patient: Tsering Kerr    Procedure Summary     Date:  03/12/20 Room / Location:      Anesthesia Start:  0318 Anesthesia Stop:  0806    Procedure:  LABOR ANALGESIA Diagnosis:      Scheduled Providers:   Provider:  Donavan Lizama MD    Anesthesia Type:  epidural ASA Status:  2 - Emergent          Anesthesia Type: epidural    Vitals  Vitals Value Taken Time   /78 3/13/2020  7:45 AM   Temp 97.8 °F (36.6 °C) 3/13/2020  7:45 AM   Pulse 101 3/13/2020  7:45 AM   Resp 16 3/13/2020  7:45 AM   SpO2             Post Anesthesia Care and Evaluation    Patient location during evaluation: bedside  Patient participation: complete - patient participated  Level of consciousness: awake and alert  Pain management: adequate  Airway patency: patent  Anesthetic complications: No anesthetic complications    Cardiovascular status: acceptable  Respiratory status: acceptable  Hydration status: acceptable  Post Neuraxial Block status: Motor and sensory function returned to baseline and No signs or symptoms of PDPH

## 2020-03-14 VITALS
DIASTOLIC BLOOD PRESSURE: 77 MMHG | WEIGHT: 236 LBS | HEIGHT: 70 IN | RESPIRATION RATE: 20 BRPM | TEMPERATURE: 97.9 F | HEART RATE: 97 BPM | BODY MASS INDEX: 33.79 KG/M2 | SYSTOLIC BLOOD PRESSURE: 135 MMHG

## 2020-03-14 LAB
BASOPHILS # BLD AUTO: 0.09 10*3/MM3 (ref 0–0.2)
BASOPHILS NFR BLD AUTO: 0.6 % (ref 0–1.5)
DEPRECATED RDW RBC AUTO: 45.3 FL (ref 37–54)
EOSINOPHIL # BLD AUTO: 0.23 10*3/MM3 (ref 0–0.4)
EOSINOPHIL NFR BLD AUTO: 1.6 % (ref 0.3–6.2)
ERYTHROCYTE [DISTWIDTH] IN BLOOD BY AUTOMATED COUNT: 14 % (ref 12.3–15.4)
HCT VFR BLD AUTO: 32.7 % (ref 34–46.6)
HGB BLD-MCNC: 10.3 G/DL (ref 12–15.9)
IMM GRANULOCYTES # BLD AUTO: 0.08 10*3/MM3 (ref 0–0.05)
IMM GRANULOCYTES NFR BLD AUTO: 0.6 % (ref 0–0.5)
LYMPHOCYTES # BLD AUTO: 2.46 10*3/MM3 (ref 0.7–3.1)
LYMPHOCYTES NFR BLD AUTO: 17.2 % (ref 19.6–45.3)
MCH RBC QN AUTO: 28.3 PG (ref 26.6–33)
MCHC RBC AUTO-ENTMCNC: 31.5 G/DL (ref 31.5–35.7)
MCV RBC AUTO: 89.8 FL (ref 79–97)
MONOCYTES # BLD AUTO: 0.81 10*3/MM3 (ref 0.1–0.9)
MONOCYTES NFR BLD AUTO: 5.7 % (ref 5–12)
NEUTROPHILS # BLD AUTO: 10.62 10*3/MM3 (ref 1.7–7)
NEUTROPHILS NFR BLD AUTO: 74.3 % (ref 42.7–76)
NRBC BLD AUTO-RTO: 0 /100 WBC (ref 0–0.2)
PLATELET # BLD AUTO: 286 10*3/MM3 (ref 140–450)
PMV BLD AUTO: 10.8 FL (ref 6–12)
RBC # BLD AUTO: 3.64 10*6/MM3 (ref 3.77–5.28)
WBC NRBC COR # BLD: 14.29 10*3/MM3 (ref 3.4–10.8)

## 2020-03-14 PROCEDURE — 85025 COMPLETE CBC W/AUTO DIFF WBC: CPT | Performed by: ADVANCED PRACTICE MIDWIFE

## 2020-03-14 RX ORDER — IBUPROFEN 600 MG/1
600 TABLET ORAL EVERY 6 HOURS PRN
Qty: 60 TABLET | Refills: 1 | Status: SHIPPED | OUTPATIENT
Start: 2020-03-14 | End: 2020-10-11

## 2020-03-14 RX ORDER — DOCUSATE SODIUM 100 MG/1
100 CAPSULE, LIQUID FILLED ORAL 2 TIMES DAILY PRN
Qty: 60 CAPSULE | Refills: 1 | Status: SHIPPED | OUTPATIENT
Start: 2020-03-14 | End: 2020-10-11

## 2020-03-14 RX ADMIN — DOCUSATE SODIUM 100 MG: 100 CAPSULE, LIQUID FILLED ORAL at 10:16

## 2020-03-14 RX ADMIN — PRENATAL VIT W/ FE FUMARATE-FA TAB 27-0.8 MG 1 TABLET: 27-0.8 TAB at 10:15

## 2020-03-14 RX ADMIN — IBUPROFEN 600 MG: 600 TABLET, FILM COATED ORAL at 10:15

## 2020-03-14 NOTE — DISCHARGE SUMMARY
[facility]  Vaginal delivery discharge summary      Patient: Tsering Kerr      MR#:[id number]  Admission  Diagnosis:   Patient Active Problem List   Diagnosis   • Meconium in amniotic fluid affecting management of mother in third trimester   • Forceps delivery     Discharge Diagnosis: S/P forceps assisted vaginal delivery.      Date of Admission: 3/12/2020  Date of Discharge:  3/14/2020    Procedures:  Vaginal, Forceps     3/12/2020    8:00 AM      Service:  Obstetrics    Hospital Course:  Patient underwent vaginal delivery and remained in the hospital for 2 days.  During that time she remained afebrile and hemodynamically stable.  On the day of discharge, she was eating, ambulating and voiding without difficulty.  She states breastfeeding is going well.     Labs:    Lab Results   Component Value Date    WBC 14.29 (H) 03/14/2020    HGB 10.3 (L) 03/14/2020    HCT 32.7 (L) 03/14/2020    MCV 89.8 03/14/2020     03/14/2020     Results from last 7 days   Lab Units 03/12/20  0233   ABO TYPING  A   RH TYPING  Positive   ANTIBODY SCREEN  Negative       Discharge Medications     Discharge Medications      New Medications      Instructions Start Date   docusate sodium 100 MG capsule   100 mg, Oral, 2 Times Daily PRN      ibuprofen 600 MG tablet  Commonly known as:  ADVIL,MOTRIN   600 mg, Oral, Every 6 Hours PRN         Continue These Medications      Instructions Start Date   Prenatal 27-1 27-1 MG tablet tablet   Oral, Daily             Discharge Disposition:  To Home    Discharge Condition:  Stable    Discharge Diet: Regular    Activity at Discharge: Pelvic rest    Follow-up Appointments  Follow up with LW in 6 weeks.     Alice Johnson CNM  03/14/20  13:19

## 2020-03-14 NOTE — LACTATION NOTE
03/13/20 1530   Maternal Information   Date of Referral 03/13/20   Person Making Referral   (fu consult)   Maternal Reason for Referral latch painful  (mildly painful at beginning)   Maternal Assessment   Breast Size Issue none   Breast Shape Bilateral:;round   Breast Density Bilateral:;soft   Nipples Bilateral:;everted   Left Nipple Symptoms tender;redness   Right Nipple Symptoms redness;tender   Maternal Infant Feeding   Maternal Emotional State assist needed;tense   Infant Positioning clutch/football   Signs of Milk Transfer infant jaw motion present   Pain with Feeding no   Comfort Measures Before/During Feeding infant position adjusted;latch adjusted;maternal position adjusted   Latch Assistance yes   Equipment Type   Breast Pump Type double electric, personal  (pump at home)   Reproductive Interventions   Breastfeeding Assistance support offered;assisted with positioning;feeding cue recognition promoted;feeding on demand promoted;feeding session observed;infant latch-on verified   Breastfeeding Support encouragement provided;lactation counseling provided   Helped pt with latch and position. Able to get baby deeper and mom states no pain now. Will continue working on getting deeper latch. Encouraged as much skin to skin as possible. Teaching done as documented under Education. To call lactation services, if there are questions or concerns.

## 2020-03-14 NOTE — PLAN OF CARE
Problem: Patient Care Overview  Goal: Plan of Care Review  Outcome: Ongoing (interventions implemented as appropriate)  Flowsheets (Taken 3/13/2020 8356)  Outcome Summary: vss. light bleeding. received a colace. up adlib. voided with no problems.

## 2020-03-14 NOTE — PROGRESS NOTES
3/14/2020  PPD #2    Subjective   Tsering feels well.  Patient describes her lochia as less than menses.  Pain is well controlled  She is breastfeeding.      Objective   Temp: Temp:  [97.9 °F (36.6 °C)-98.1 °F (36.7 °C)] 97.9 °F (36.6 °C) Temp src: Oral   BP: BP: (124-135)/(67-77) 135/77        Pulse: Heart Rate:  [84-97] 97  RR: Resp:  [16-20] 20    General:  No acute distress   Abdomen: Fundus firm and beneath umbilicus   Pelvis: deferred     Lab Results   Component Value Date    WBC 14.29 (H) 03/14/2020    HGB 10.3 (L) 03/14/2020    HCT 32.7 (L) 03/14/2020    MCV 89.8 03/14/2020     03/14/2020    HEPBSAG Non-Reactive 08/02/2019       Assessment  1. PPD# 2 after vaginal delivery    Plan  1. Discharge to home  2. Follow up with LW  in 6 weeks  3. Prescriptions for Motrin and colace prescribed and advised on weaning.  4. Advised no tampons, intercourse, or tub baths for 6 weeks.       This note has been electronically signed.    Alice Johnson CNM  13:20  March 14, 2020

## 2020-10-11 PROCEDURE — U0003 INFECTIOUS AGENT DETECTION BY NUCLEIC ACID (DNA OR RNA); SEVERE ACUTE RESPIRATORY SYNDROME CORONAVIRUS 2 (SARS-COV-2) (CORONAVIRUS DISEASE [COVID-19]), AMPLIFIED PROBE TECHNIQUE, MAKING USE OF HIGH THROUGHPUT TECHNOLOGIES AS DESCRIBED BY CMS-2020-01-R: HCPCS | Performed by: NURSE PRACTITIONER

## 2020-10-13 ENCOUNTER — TELEPHONE (OUTPATIENT)
Dept: URGENT CARE | Facility: CLINIC | Age: 40
End: 2020-10-13

## 2020-10-13 NOTE — TELEPHONE ENCOUNTER
----- Message from Ajit Oshea MD sent at 10/13/2020  2:20 PM EDT -----  Pt is positive  Pt called and informed of positive covid result. Pt states she is feeling fine. Advised pt to remain in home quarantine for 14 days from onset of symptoms. Also informed pt that the health department would be in contact with her. VU. No questions at this time.

## 2021-09-21 ENCOUNTER — APPOINTMENT (OUTPATIENT)
Dept: PREADMISSION TESTING | Facility: HOSPITAL | Age: 41
End: 2021-09-21

## 2021-09-21 LAB — SARS-COV-2 RNA PNL SPEC NAA+PROBE: NOT DETECTED

## 2021-09-21 PROCEDURE — C9803 HOPD COVID-19 SPEC COLLECT: HCPCS

## 2021-09-21 PROCEDURE — U0004 COV-19 TEST NON-CDC HGH THRU: HCPCS

## 2021-09-22 ENCOUNTER — LAB (OUTPATIENT)
Dept: LAB | Facility: HOSPITAL | Age: 41
End: 2021-09-22

## 2021-09-22 ENCOUNTER — TRANSCRIBE ORDERS (OUTPATIENT)
Dept: LAB | Facility: HOSPITAL | Age: 41
End: 2021-09-22

## 2021-09-22 DIAGNOSIS — O02.1 MISSED ABORTION: ICD-10-CM

## 2021-09-22 DIAGNOSIS — O02.1 MISSED ABORTION: Primary | ICD-10-CM

## 2021-09-22 LAB
ABO GROUP BLD: NORMAL
DEPRECATED RDW RBC AUTO: 38 FL (ref 37–54)
ERYTHROCYTE [DISTWIDTH] IN BLOOD BY AUTOMATED COUNT: 12.1 % (ref 12.3–15.4)
HCT VFR BLD AUTO: 38.6 % (ref 34–46.6)
HGB BLD-MCNC: 12.6 G/DL (ref 12–15.9)
MCH RBC QN AUTO: 28.3 PG (ref 26.6–33)
MCHC RBC AUTO-ENTMCNC: 32.6 G/DL (ref 31.5–35.7)
MCV RBC AUTO: 86.7 FL (ref 79–97)
PLATELET # BLD AUTO: 246 10*3/MM3 (ref 140–450)
PMV BLD AUTO: 10.8 FL (ref 6–12)
RBC # BLD AUTO: 4.45 10*6/MM3 (ref 3.77–5.28)
RH BLD: POSITIVE
WBC # BLD AUTO: 6.35 10*3/MM3 (ref 3.4–10.8)

## 2021-09-22 PROCEDURE — 86901 BLOOD TYPING SEROLOGIC RH(D): CPT

## 2021-09-22 PROCEDURE — 86900 BLOOD TYPING SEROLOGIC ABO: CPT

## 2021-09-22 PROCEDURE — 36415 COLL VENOUS BLD VENIPUNCTURE: CPT

## 2021-09-22 PROCEDURE — 85027 COMPLETE CBC AUTOMATED: CPT

## 2021-09-23 ENCOUNTER — LAB REQUISITION (OUTPATIENT)
Dept: LAB | Facility: HOSPITAL | Age: 41
End: 2021-09-23

## 2021-09-23 DIAGNOSIS — O02.1 MISSED ABORTION: ICD-10-CM

## 2021-09-23 PROCEDURE — 88305 TISSUE EXAM BY PATHOLOGIST: CPT | Performed by: OBSTETRICS & GYNECOLOGY

## 2021-09-24 LAB
CYTO UR: NORMAL
LAB AP CASE REPORT: NORMAL
LAB AP CLINICAL INFORMATION: NORMAL
PATH REPORT.FINAL DX SPEC: NORMAL
PATH REPORT.GROSS SPEC: NORMAL

## 2022-09-20 ENCOUNTER — LAB (OUTPATIENT)
Dept: LAB | Facility: HOSPITAL | Age: 42
End: 2022-09-20

## 2022-09-20 ENCOUNTER — TRANSCRIBE ORDERS (OUTPATIENT)
Dept: LAB | Facility: HOSPITAL | Age: 42
End: 2022-09-20

## 2022-09-20 DIAGNOSIS — Z34.92 SECOND TRIMESTER PREGNANCY: Primary | ICD-10-CM

## 2022-09-20 DIAGNOSIS — Z34.92 SECOND TRIMESTER PREGNANCY: ICD-10-CM

## 2022-09-20 LAB
BASOPHILS # BLD AUTO: 0.03 10*3/MM3 (ref 0–0.2)
BASOPHILS NFR BLD AUTO: 0.3 % (ref 0–1.5)
BLD GP AB SCN SERPL QL: NEGATIVE
DEPRECATED RDW RBC AUTO: 41.9 FL (ref 37–54)
EOSINOPHIL # BLD AUTO: 0.15 10*3/MM3 (ref 0–0.4)
EOSINOPHIL NFR BLD AUTO: 1.6 % (ref 0.3–6.2)
ERYTHROCYTE [DISTWIDTH] IN BLOOD BY AUTOMATED COUNT: 12.9 % (ref 12.3–15.4)
GLUCOSE 1H P 100 G GLC PO SERPL-MCNC: 90 MG/DL (ref 65–139)
HCT VFR BLD AUTO: 33.3 % (ref 34–46.6)
HGB BLD-MCNC: 11 G/DL (ref 12–15.9)
IMM GRANULOCYTES # BLD AUTO: 0.06 10*3/MM3 (ref 0–0.05)
IMM GRANULOCYTES NFR BLD AUTO: 0.7 % (ref 0–0.5)
LYMPHOCYTES # BLD AUTO: 1.8 10*3/MM3 (ref 0.7–3.1)
LYMPHOCYTES NFR BLD AUTO: 19.7 % (ref 19.6–45.3)
MCH RBC QN AUTO: 29.3 PG (ref 26.6–33)
MCHC RBC AUTO-ENTMCNC: 33 G/DL (ref 31.5–35.7)
MCV RBC AUTO: 88.6 FL (ref 79–97)
MONOCYTES # BLD AUTO: 0.49 10*3/MM3 (ref 0.1–0.9)
MONOCYTES NFR BLD AUTO: 5.4 % (ref 5–12)
NEUTROPHILS NFR BLD AUTO: 6.62 10*3/MM3 (ref 1.7–7)
NEUTROPHILS NFR BLD AUTO: 72.3 % (ref 42.7–76)
NRBC BLD AUTO-RTO: 0 /100 WBC (ref 0–0.2)
PLATELET # BLD AUTO: 237 10*3/MM3 (ref 140–450)
PMV BLD AUTO: 10.8 FL (ref 6–12)
RBC # BLD AUTO: 3.76 10*6/MM3 (ref 3.77–5.28)
WBC NRBC COR # BLD: 9.15 10*3/MM3 (ref 3.4–10.8)

## 2022-09-20 PROCEDURE — 36415 COLL VENOUS BLD VENIPUNCTURE: CPT

## 2022-09-20 PROCEDURE — 82962 GLUCOSE BLOOD TEST: CPT | Performed by: ADVANCED PRACTICE MIDWIFE

## 2022-09-20 PROCEDURE — 82950 GLUCOSE TEST: CPT

## 2022-09-20 PROCEDURE — 85025 COMPLETE CBC W/AUTO DIFF WBC: CPT

## 2022-09-20 PROCEDURE — 86850 RBC ANTIBODY SCREEN: CPT

## 2022-11-27 LAB — EXTERNAL GROUP B STREP ANTIGEN: NEGATIVE

## 2022-12-16 ENCOUNTER — HOSPITAL ENCOUNTER (INPATIENT)
Facility: HOSPITAL | Age: 42
LOS: 2 days | Discharge: HOME | End: 2022-12-19
Attending: ADVANCED PRACTICE MIDWIFE | Admitting: OBSTETRICS & GYNECOLOGY

## 2022-12-16 ENCOUNTER — HOSPITAL ENCOUNTER (OUTPATIENT)
Dept: LABOR AND DELIVERY | Facility: HOSPITAL | Age: 42
Discharge: HOME | End: 2022-12-16

## 2022-12-16 LAB
ABO GROUP BLD: NORMAL
ALP SERPL-CCNC: 134 U/L (ref 39–117)
ALT SERPL W P-5'-P-CCNC: 11 U/L (ref 1–33)
AST SERPL-CCNC: 17 U/L (ref 1–32)
BILIRUB SERPL-MCNC: 0.2 MG/DL (ref 0–1.2)
BLD GP AB SCN SERPL QL: NEGATIVE
CREAT SERPL-MCNC: 0.71 MG/DL (ref 0.57–1)
DEPRECATED RDW RBC AUTO: 42.3 FL (ref 37–54)
ERYTHROCYTE [DISTWIDTH] IN BLOOD BY AUTOMATED COUNT: 13.6 % (ref 12.3–15.4)
HCT VFR BLD AUTO: 33.6 % (ref 34–46.6)
HGB BLD-MCNC: 11.3 G/DL (ref 12–15.9)
LDH SERPL-CCNC: 213 U/L (ref 135–214)
MCH RBC QN AUTO: 29.1 PG (ref 26.6–33)
MCHC RBC AUTO-ENTMCNC: 33.6 G/DL (ref 31.5–35.7)
MCV RBC AUTO: 86.6 FL (ref 79–97)
PLATELET # BLD AUTO: 239 10*3/MM3 (ref 140–450)
PMV BLD AUTO: 10.7 FL (ref 6–12)
RBC # BLD AUTO: 3.88 10*6/MM3 (ref 3.77–5.28)
RH BLD: POSITIVE
T&S EXPIRATION DATE: NORMAL
URATE SERPL-MCNC: 4 MG/DL (ref 2.4–5.7)
WBC NRBC COR # BLD: 8.68 10*3/MM3 (ref 3.4–10.8)

## 2022-12-16 PROCEDURE — 86901 BLOOD TYPING SEROLOGIC RH(D): CPT | Performed by: ADVANCED PRACTICE MIDWIFE

## 2022-12-16 PROCEDURE — 84550 ASSAY OF BLOOD/URIC ACID: CPT | Performed by: ADVANCED PRACTICE MIDWIFE

## 2022-12-16 PROCEDURE — 83615 LACTATE (LD) (LDH) ENZYME: CPT | Performed by: ADVANCED PRACTICE MIDWIFE

## 2022-12-16 PROCEDURE — 84450 TRANSFERASE (AST) (SGOT): CPT | Performed by: ADVANCED PRACTICE MIDWIFE

## 2022-12-16 PROCEDURE — 84075 ASSAY ALKALINE PHOSPHATASE: CPT | Performed by: ADVANCED PRACTICE MIDWIFE

## 2022-12-16 PROCEDURE — 86850 RBC ANTIBODY SCREEN: CPT | Performed by: ADVANCED PRACTICE MIDWIFE

## 2022-12-16 PROCEDURE — 82565 ASSAY OF CREATININE: CPT | Performed by: ADVANCED PRACTICE MIDWIFE

## 2022-12-16 PROCEDURE — 82247 BILIRUBIN TOTAL: CPT | Performed by: ADVANCED PRACTICE MIDWIFE

## 2022-12-16 PROCEDURE — 86900 BLOOD TYPING SEROLOGIC ABO: CPT | Performed by: ADVANCED PRACTICE MIDWIFE

## 2022-12-16 PROCEDURE — 85027 COMPLETE CBC AUTOMATED: CPT | Performed by: ADVANCED PRACTICE MIDWIFE

## 2022-12-16 PROCEDURE — 84460 ALANINE AMINO (ALT) (SGPT): CPT | Performed by: ADVANCED PRACTICE MIDWIFE

## 2022-12-16 PROCEDURE — 36415 COLL VENOUS BLD VENIPUNCTURE: CPT | Performed by: ADVANCED PRACTICE MIDWIFE

## 2022-12-16 RX ORDER — MAGNESIUM CARB/ALUMINUM HYDROX 105-160MG
30 TABLET,CHEWABLE ORAL ONCE
Status: COMPLETED | OUTPATIENT
Start: 2022-12-16 | End: 2022-12-17

## 2022-12-16 RX ORDER — ACETAMINOPHEN 325 MG/1
650 TABLET ORAL EVERY 4 HOURS PRN
Status: CANCELLED | OUTPATIENT
Start: 2022-12-16

## 2022-12-16 RX ORDER — SODIUM CHLORIDE 0.9 % (FLUSH) 0.9 %
10 SYRINGE (ML) INJECTION AS NEEDED
Status: CANCELLED | OUTPATIENT
Start: 2022-12-16

## 2022-12-16 RX ORDER — ONDANSETRON 4 MG/1
4 TABLET, FILM COATED ORAL EVERY 6 HOURS PRN
Status: CANCELLED | OUTPATIENT
Start: 2022-12-16

## 2022-12-16 RX ORDER — OXYTOCIN 10 [USP'U]/ML
10 INJECTION, SOLUTION INTRAMUSCULAR; INTRAVENOUS ONCE
Status: CANCELLED | OUTPATIENT
Start: 2022-12-16 | End: 2022-12-16

## 2022-12-16 RX ORDER — OXYTOCIN/0.9 % SODIUM CHLORIDE 30/500 ML
2-20 PLASTIC BAG, INJECTION (ML) INTRAVENOUS
Status: CANCELLED | OUTPATIENT
Start: 2022-12-17

## 2022-12-16 RX ORDER — DIPHENHYDRAMINE HYDROCHLORIDE 50 MG/ML
25 INJECTION INTRAMUSCULAR; INTRAVENOUS NIGHTLY PRN
Status: DISCONTINUED | OUTPATIENT
Start: 2022-12-16 | End: 2022-12-17 | Stop reason: HOSPADM

## 2022-12-16 RX ORDER — FAMOTIDINE 20 MG/1
20 TABLET, FILM COATED ORAL EVERY 12 HOURS PRN
Status: CANCELLED | OUTPATIENT
Start: 2022-12-16

## 2022-12-16 RX ORDER — METHYLERGONOVINE MALEATE 0.2 MG/ML
200 INJECTION INTRAVENOUS ONCE AS NEEDED
Status: CANCELLED | OUTPATIENT
Start: 2022-12-16

## 2022-12-16 RX ORDER — TERBUTALINE SULFATE 1 MG/ML
0.25 INJECTION, SOLUTION SUBCUTANEOUS AS NEEDED
Status: DISCONTINUED | OUTPATIENT
Start: 2022-12-16 | End: 2022-12-17 | Stop reason: HOSPADM

## 2022-12-16 RX ORDER — SODIUM CHLORIDE 0.9 % (FLUSH) 0.9 %
10 SYRINGE (ML) INJECTION AS NEEDED
Status: DISCONTINUED | OUTPATIENT
Start: 2022-12-16 | End: 2022-12-17 | Stop reason: HOSPADM

## 2022-12-16 RX ORDER — DIPHENHYDRAMINE HCL 25 MG
25 CAPSULE ORAL NIGHTLY PRN
Status: CANCELLED | OUTPATIENT
Start: 2022-12-16

## 2022-12-16 RX ORDER — TERBUTALINE SULFATE 1 MG/ML
0.25 INJECTION, SOLUTION SUBCUTANEOUS AS NEEDED
Status: CANCELLED | OUTPATIENT
Start: 2022-12-16

## 2022-12-16 RX ORDER — OXYTOCIN/0.9 % SODIUM CHLORIDE 30/500 ML
2-20 PLASTIC BAG, INJECTION (ML) INTRAVENOUS
Status: DISCONTINUED | OUTPATIENT
Start: 2022-12-17 | End: 2022-12-17 | Stop reason: HOSPADM

## 2022-12-16 RX ORDER — FAMOTIDINE 20 MG/1
20 TABLET, FILM COATED ORAL EVERY 12 HOURS PRN
Status: DISCONTINUED | OUTPATIENT
Start: 2022-12-16 | End: 2022-12-17 | Stop reason: HOSPADM

## 2022-12-16 RX ORDER — LIDOCAINE HYDROCHLORIDE 10 MG/ML
5 INJECTION, SOLUTION EPIDURAL; INFILTRATION; INTRACAUDAL; PERINEURAL AS NEEDED
Status: CANCELLED | OUTPATIENT
Start: 2022-12-16

## 2022-12-16 RX ORDER — IBUPROFEN 600 MG/1
600 TABLET ORAL EVERY 6 HOURS PRN
Status: CANCELLED | OUTPATIENT
Start: 2022-12-16

## 2022-12-16 RX ORDER — BUTORPHANOL TARTRATE 1 MG/ML
1 INJECTION, SOLUTION INTRAMUSCULAR; INTRAVENOUS
Status: CANCELLED | OUTPATIENT
Start: 2022-12-16

## 2022-12-16 RX ORDER — SODIUM CHLORIDE 0.9 % (FLUSH) 0.9 %
10 SYRINGE (ML) INJECTION EVERY 12 HOURS SCHEDULED
Status: DISCONTINUED | OUTPATIENT
Start: 2022-12-16 | End: 2022-12-17 | Stop reason: HOSPADM

## 2022-12-16 RX ORDER — LIDOCAINE HYDROCHLORIDE 10 MG/ML
5 INJECTION, SOLUTION EPIDURAL; INFILTRATION; INTRACAUDAL; PERINEURAL AS NEEDED
Status: DISCONTINUED | OUTPATIENT
Start: 2022-12-16 | End: 2022-12-17 | Stop reason: HOSPADM

## 2022-12-16 RX ORDER — FAMOTIDINE 10 MG/ML
20 INJECTION, SOLUTION INTRAVENOUS EVERY 12 HOURS PRN
Status: DISCONTINUED | OUTPATIENT
Start: 2022-12-16 | End: 2022-12-17 | Stop reason: HOSPADM

## 2022-12-16 RX ORDER — ACETAMINOPHEN 325 MG/1
650 TABLET ORAL EVERY 4 HOURS PRN
Status: DISCONTINUED | OUTPATIENT
Start: 2022-12-16 | End: 2022-12-17 | Stop reason: HOSPADM

## 2022-12-16 RX ORDER — ONDANSETRON 2 MG/ML
4 INJECTION INTRAMUSCULAR; INTRAVENOUS EVERY 6 HOURS PRN
Status: CANCELLED | OUTPATIENT
Start: 2022-12-16

## 2022-12-16 RX ORDER — SODIUM CHLORIDE, SODIUM LACTATE, POTASSIUM CHLORIDE, CALCIUM CHLORIDE 600; 310; 30; 20 MG/100ML; MG/100ML; MG/100ML; MG/100ML
125 INJECTION, SOLUTION INTRAVENOUS CONTINUOUS
Status: CANCELLED | OUTPATIENT
Start: 2022-12-16

## 2022-12-16 RX ORDER — BUTORPHANOL TARTRATE 1 MG/ML
1 INJECTION, SOLUTION INTRAMUSCULAR; INTRAVENOUS
Status: DISCONTINUED | OUTPATIENT
Start: 2022-12-16 | End: 2022-12-17 | Stop reason: HOSPADM

## 2022-12-16 RX ORDER — ONDANSETRON 2 MG/ML
4 INJECTION INTRAMUSCULAR; INTRAVENOUS EVERY 6 HOURS PRN
Status: DISCONTINUED | OUTPATIENT
Start: 2022-12-16 | End: 2022-12-17 | Stop reason: SDUPTHER

## 2022-12-16 RX ORDER — SODIUM CHLORIDE, SODIUM LACTATE, POTASSIUM CHLORIDE, CALCIUM CHLORIDE 600; 310; 30; 20 MG/100ML; MG/100ML; MG/100ML; MG/100ML
125 INJECTION, SOLUTION INTRAVENOUS CONTINUOUS
Status: DISCONTINUED | OUTPATIENT
Start: 2022-12-16 | End: 2022-12-19 | Stop reason: HOSPADM

## 2022-12-16 RX ORDER — ONDANSETRON 4 MG/1
4 TABLET, FILM COATED ORAL EVERY 6 HOURS PRN
Status: DISCONTINUED | OUTPATIENT
Start: 2022-12-16 | End: 2022-12-17 | Stop reason: HOSPADM

## 2022-12-16 RX ORDER — DIPHENHYDRAMINE HCL 25 MG
25 CAPSULE ORAL NIGHTLY PRN
Status: DISCONTINUED | OUTPATIENT
Start: 2022-12-16 | End: 2022-12-17 | Stop reason: HOSPADM

## 2022-12-16 RX ORDER — SODIUM CHLORIDE 0.9 % (FLUSH) 0.9 %
10 SYRINGE (ML) INJECTION EVERY 12 HOURS SCHEDULED
Status: CANCELLED | OUTPATIENT
Start: 2022-12-16

## 2022-12-16 RX ORDER — DIPHENHYDRAMINE HYDROCHLORIDE 50 MG/ML
25 INJECTION INTRAMUSCULAR; INTRAVENOUS NIGHTLY PRN
Status: CANCELLED | OUTPATIENT
Start: 2022-12-16

## 2022-12-16 RX ORDER — MAGNESIUM CARB/ALUMINUM HYDROX 105-160MG
30 TABLET,CHEWABLE ORAL ONCE
Status: CANCELLED | OUTPATIENT
Start: 2022-12-16 | End: 2022-12-16

## 2022-12-16 RX ORDER — CARBOPROST TROMETHAMINE 250 UG/ML
250 INJECTION, SOLUTION INTRAMUSCULAR AS NEEDED
Status: CANCELLED | OUTPATIENT
Start: 2022-12-16

## 2022-12-16 RX ORDER — MISOPROSTOL 200 UG/1
800 TABLET ORAL AS NEEDED
Status: CANCELLED | OUTPATIENT
Start: 2022-12-16

## 2022-12-17 ENCOUNTER — ANESTHESIA (OUTPATIENT)
Dept: LABOR AND DELIVERY | Facility: HOSPITAL | Age: 42
End: 2022-12-17

## 2022-12-17 ENCOUNTER — ANESTHESIA EVENT (OUTPATIENT)
Dept: LABOR AND DELIVERY | Facility: HOSPITAL | Age: 42
End: 2022-12-17

## 2022-12-17 PROBLEM — Z3A.40 40 WEEKS GESTATION OF PREGNANCY: Status: ACTIVE | Noted: 2022-12-17

## 2022-12-17 PROBLEM — Z3A.40 40 WEEKS GESTATION OF PREGNANCY: Status: RESOLVED | Noted: 2022-12-17 | Resolved: 2022-12-17

## 2022-12-17 PROCEDURE — 10907ZC DRAINAGE OF AMNIOTIC FLUID, THERAPEUTIC FROM PRODUCTS OF CONCEPTION, VIA NATURAL OR ARTIFICIAL OPENING: ICD-10-PCS | Performed by: ADVANCED PRACTICE MIDWIFE

## 2022-12-17 PROCEDURE — 3E033VJ INTRODUCTION OF OTHER HORMONE INTO PERIPHERAL VEIN, PERCUTANEOUS APPROACH: ICD-10-PCS | Performed by: ADVANCED PRACTICE MIDWIFE

## 2022-12-17 PROCEDURE — C1755 CATHETER, INTRASPINAL: HCPCS | Performed by: ANESTHESIOLOGY

## 2022-12-17 PROCEDURE — C1755 CATHETER, INTRASPINAL: HCPCS

## 2022-12-17 PROCEDURE — 59025 FETAL NON-STRESS TEST: CPT

## 2022-12-17 PROCEDURE — 25010000002 OXYTOCIN PER 10 UNITS

## 2022-12-17 PROCEDURE — 25010000002 ONDANSETRON PER 1 MG: Performed by: ADVANCED PRACTICE MIDWIFE

## 2022-12-17 PROCEDURE — 25010000002 ROPIVACAINE PER 1 MG: Performed by: ANESTHESIOLOGY

## 2022-12-17 PROCEDURE — 51703 INSERT BLADDER CATH COMPLEX: CPT

## 2022-12-17 PROCEDURE — 25010000002 FENTANYL CITRATE (PF) 50 MCG/ML SOLUTION: Performed by: ANESTHESIOLOGY

## 2022-12-17 RX ORDER — CARBOPROST TROMETHAMINE 250 UG/ML
250 INJECTION, SOLUTION INTRAMUSCULAR AS NEEDED
Status: DISCONTINUED | OUTPATIENT
Start: 2022-12-17 | End: 2022-12-17 | Stop reason: HOSPADM

## 2022-12-17 RX ORDER — ROPIVACAINE HYDROCHLORIDE 2 MG/ML
15 INJECTION, SOLUTION EPIDURAL; INFILTRATION; PERINEURAL CONTINUOUS
Status: DISCONTINUED | OUTPATIENT
Start: 2022-12-17 | End: 2022-12-19 | Stop reason: HOSPADM

## 2022-12-17 RX ORDER — TRISODIUM CITRATE DIHYDRATE AND CITRIC ACID MONOHYDRATE 500; 334 MG/5ML; MG/5ML
30 SOLUTION ORAL ONCE
Status: DISCONTINUED | OUTPATIENT
Start: 2022-12-17 | End: 2022-12-17 | Stop reason: HOSPADM

## 2022-12-17 RX ORDER — METOCLOPRAMIDE HYDROCHLORIDE 5 MG/ML
10 INJECTION INTRAMUSCULAR; INTRAVENOUS ONCE AS NEEDED
Status: DISCONTINUED | OUTPATIENT
Start: 2022-12-17 | End: 2022-12-17 | Stop reason: HOSPADM

## 2022-12-17 RX ORDER — IBUPROFEN 600 MG/1
600 TABLET ORAL EVERY 6 HOURS PRN
Status: DISCONTINUED | OUTPATIENT
Start: 2022-12-17 | End: 2022-12-19 | Stop reason: HOSPADM

## 2022-12-17 RX ORDER — OXYTOCIN 10 [USP'U]/ML
10 INJECTION, SOLUTION INTRAMUSCULAR; INTRAVENOUS ONCE
Status: DISCONTINUED | OUTPATIENT
Start: 2022-12-17 | End: 2022-12-17 | Stop reason: HOSPADM

## 2022-12-17 RX ORDER — OXYTOCIN/0.9 % SODIUM CHLORIDE 30/500 ML
PLASTIC BAG, INJECTION (ML) INTRAVENOUS
Status: COMPLETED
Start: 2022-12-17 | End: 2022-12-17

## 2022-12-17 RX ORDER — FENTANYL CITRATE 50 UG/ML
INJECTION, SOLUTION INTRAMUSCULAR; INTRAVENOUS AS NEEDED
Status: DISCONTINUED | OUTPATIENT
Start: 2022-12-17 | End: 2022-12-17 | Stop reason: SURG

## 2022-12-17 RX ORDER — EPHEDRINE SULFATE 5 MG/ML
10 INJECTION INTRAVENOUS
Status: DISCONTINUED | OUTPATIENT
Start: 2022-12-17 | End: 2022-12-17 | Stop reason: HOSPADM

## 2022-12-17 RX ORDER — EPHEDRINE SULFATE 5 MG/ML
INJECTION INTRAVENOUS
Status: DISCONTINUED
Start: 2022-12-17 | End: 2022-12-17 | Stop reason: WASHOUT

## 2022-12-17 RX ORDER — DOCUSATE SODIUM 100 MG/1
100 CAPSULE, LIQUID FILLED ORAL 2 TIMES DAILY
Status: DISCONTINUED | OUTPATIENT
Start: 2022-12-17 | End: 2022-12-19 | Stop reason: HOSPADM

## 2022-12-17 RX ORDER — FAMOTIDINE 10 MG/ML
20 INJECTION, SOLUTION INTRAVENOUS ONCE AS NEEDED
Status: COMPLETED | OUTPATIENT
Start: 2022-12-17 | End: 2022-12-17

## 2022-12-17 RX ORDER — ONDANSETRON 2 MG/ML
4 INJECTION INTRAMUSCULAR; INTRAVENOUS EVERY 6 HOURS PRN
Status: DISCONTINUED | OUTPATIENT
Start: 2022-12-17 | End: 2022-12-19 | Stop reason: HOSPADM

## 2022-12-17 RX ORDER — IBUPROFEN 600 MG/1
600 TABLET ORAL EVERY 6 HOURS PRN
Status: DISCONTINUED | OUTPATIENT
Start: 2022-12-17 | End: 2022-12-17 | Stop reason: HOSPADM

## 2022-12-17 RX ORDER — HYDROCORTISONE 25 MG/G
1 CREAM TOPICAL AS NEEDED
Status: DISCONTINUED | OUTPATIENT
Start: 2022-12-17 | End: 2022-12-19 | Stop reason: HOSPADM

## 2022-12-17 RX ORDER — MISOPROSTOL 200 UG/1
800 TABLET ORAL AS NEEDED
Status: DISCONTINUED | OUTPATIENT
Start: 2022-12-17 | End: 2022-12-17 | Stop reason: HOSPADM

## 2022-12-17 RX ORDER — PRENATAL VIT/IRON FUM/FOLIC AC 27MG-0.8MG
1 TABLET ORAL DAILY
Status: DISCONTINUED | OUTPATIENT
Start: 2022-12-17 | End: 2022-12-19 | Stop reason: HOSPADM

## 2022-12-17 RX ORDER — DIPHENHYDRAMINE HYDROCHLORIDE 50 MG/ML
12.5 INJECTION INTRAMUSCULAR; INTRAVENOUS EVERY 8 HOURS PRN
Status: DISCONTINUED | OUTPATIENT
Start: 2022-12-17 | End: 2022-12-17 | Stop reason: HOSPADM

## 2022-12-17 RX ORDER — METHYLERGONOVINE MALEATE 0.2 MG/ML
200 INJECTION INTRAVENOUS ONCE AS NEEDED
Status: DISCONTINUED | OUTPATIENT
Start: 2022-12-17 | End: 2022-12-17 | Stop reason: HOSPADM

## 2022-12-17 RX ORDER — BISACODYL 10 MG
10 SUPPOSITORY, RECTAL RECTAL DAILY PRN
Status: DISCONTINUED | OUTPATIENT
Start: 2022-12-18 | End: 2022-12-19 | Stop reason: HOSPADM

## 2022-12-17 RX ORDER — LIDOCAINE HYDROCHLORIDE AND EPINEPHRINE 15; 5 MG/ML; UG/ML
INJECTION, SOLUTION EPIDURAL AS NEEDED
Status: DISCONTINUED | OUTPATIENT
Start: 2022-12-17 | End: 2022-12-17 | Stop reason: SURG

## 2022-12-17 RX ORDER — ROPIVACAINE HYDROCHLORIDE 5 MG/ML
INJECTION, SOLUTION EPIDURAL; INFILTRATION; PERINEURAL AS NEEDED
Status: DISCONTINUED | OUTPATIENT
Start: 2022-12-17 | End: 2022-12-17 | Stop reason: SURG

## 2022-12-17 RX ORDER — ONDANSETRON 2 MG/ML
4 INJECTION INTRAMUSCULAR; INTRAVENOUS ONCE AS NEEDED
Status: DISCONTINUED | OUTPATIENT
Start: 2022-12-17 | End: 2022-12-17 | Stop reason: HOSPADM

## 2022-12-17 RX ORDER — SODIUM CHLORIDE 0.9 % (FLUSH) 0.9 %
1-10 SYRINGE (ML) INJECTION AS NEEDED
Status: DISCONTINUED | OUTPATIENT
Start: 2022-12-17 | End: 2022-12-19 | Stop reason: HOSPADM

## 2022-12-17 RX ADMIN — FENTANYL CITRATE 100 MCG: 50 INJECTION, SOLUTION INTRAMUSCULAR; INTRAVENOUS at 08:40

## 2022-12-17 RX ADMIN — SODIUM CHLORIDE, POTASSIUM CHLORIDE, SODIUM LACTATE AND CALCIUM CHLORIDE 1000 ML: 600; 310; 30; 20 INJECTION, SOLUTION INTRAVENOUS at 08:05

## 2022-12-17 RX ADMIN — MINERAL OIL 30 ML: 1000 SOLUTION ORAL at 10:50

## 2022-12-17 RX ADMIN — ROPIVACAINE HYDROCHLORIDE 16 ML/HR: 2 INJECTION, SOLUTION EPIDURAL; INFILTRATION at 08:40

## 2022-12-17 RX ADMIN — Medication 1 APPLICATION: at 14:51

## 2022-12-17 RX ADMIN — FAMOTIDINE 20 MG: 10 INJECTION INTRAVENOUS at 10:23

## 2022-12-17 RX ADMIN — IBUPROFEN 600 MG: 600 TABLET ORAL at 11:55

## 2022-12-17 RX ADMIN — OXYTOCIN 2 MILLI-UNITS/MIN: 10 INJECTION, SOLUTION INTRAMUSCULAR; INTRAVENOUS at 00:21

## 2022-12-17 RX ADMIN — Medication: at 14:51

## 2022-12-17 RX ADMIN — LIDOCAINE HYDROCHLORIDE AND EPINEPHRINE 3 ML: 15; 5 INJECTION, SOLUTION EPIDURAL at 08:40

## 2022-12-17 RX ADMIN — ROPIVACAINE HYDROCHLORIDE 6 ML: 5 INJECTION, SOLUTION EPIDURAL; INFILTRATION; PERINEURAL at 08:40

## 2022-12-17 RX ADMIN — PRENATAL VITAMINS-IRON FUMARATE 27 MG IRON-FOLIC ACID 0.8 MG TABLET 1 TABLET: at 14:51

## 2022-12-17 RX ADMIN — SODIUM CHLORIDE, POTASSIUM CHLORIDE, SODIUM LACTATE AND CALCIUM CHLORIDE 125 ML/HR: 600; 310; 30; 20 INJECTION, SOLUTION INTRAVENOUS at 00:23

## 2022-12-17 RX ADMIN — DOCUSATE SODIUM 100 MG: 100 CAPSULE, LIQUID FILLED ORAL at 21:09

## 2022-12-17 RX ADMIN — IBUPROFEN 600 MG: 600 TABLET ORAL at 17:54

## 2022-12-17 RX ADMIN — WITCH HAZEL 1 PAD: 500 SOLUTION RECTAL; TOPICAL at 14:51

## 2022-12-17 RX ADMIN — Medication 2 MILLI-UNITS/MIN: at 00:21

## 2022-12-17 RX ADMIN — ONDANSETRON 4 MG: 2 INJECTION INTRAMUSCULAR; INTRAVENOUS at 07:51

## 2022-12-17 NOTE — ANESTHESIA PREPROCEDURE EVALUATION
Anesthesia Evaluation     Patient summary reviewed and Nursing notes reviewed                Airway   Mallampati: I  TM distance: >3 FB  Neck ROM: full  No difficulty expected  Dental - normal exam     Pulmonary - negative pulmonary ROS and normal exam   Cardiovascular - negative cardio ROS and normal exam        Neuro/Psych- negative ROS  GI/Hepatic/Renal/Endo - negative ROS     Musculoskeletal (-) negative ROS    Abdominal  - normal exam    Bowel sounds: normal.   Substance History - negative use     OB/GYN    (+) Pregnant,         Other                        Anesthesia Plan    ASA 2 - emergent     epidural       Anesthetic plan, risks, benefits, and alternatives have been provided, discussed and informed consent has been obtained with: patient.    Use of blood products discussed with patient .   Plan discussed with attending.        CODE STATUS:

## 2022-12-17 NOTE — PLAN OF CARE
Problem: Adult Inpatient Plan of Care  Goal: Plan of Care Review  Outcome: Ongoing, Progressing  Goal: Patient-Specific Goal (Individualized)  Outcome: Ongoing, Progressing  Goal: Absence of Hospital-Acquired Illness or Injury  Outcome: Ongoing, Progressing  Goal: Optimal Comfort and Wellbeing  Outcome: Ongoing, Progressing  Goal: Readiness for Transition of Care  Outcome: Ongoing, Progressing  Intervention: Mutually Develop Transition Plan  Description: Identify available resources for support (e.g., family, friends, community).  Identify and address barriers to ongoing treatment and home management (e.g., environmental, financial).  Provide opportunities to practice self-management skills.  Assess and monitor emotional readiness for transition.  Establish or reconnect linkage with outpatient providers or community-based services.  Recent Flowsheet Documentation  Taken 12/17/2022 0102 by Rama Glynn RN  Equipment Currently Used at Home: none     Problem: Bleeding (Labor)  Goal: Hemostasis  Outcome: Ongoing, Progressing     Problem: Change in Fetal Wellbeing (Labor)  Goal: Stable Fetal Wellbeing  Outcome: Ongoing, Progressing     Problem: Delayed Labor Progression (Labor)  Goal: Effective Progression to Delivery  Outcome: Ongoing, Progressing     Problem: Infection (Labor)  Goal: Absence of Infection Signs and Symptoms  Outcome: Ongoing, Progressing     Problem: Labor Pain (Labor)  Goal: Acceptable Pain Control  Outcome: Ongoing, Progressing     Problem: Uterine Tachysystole (Labor)  Goal: Normal Uterine Contraction Pattern  Outcome: Ongoing, Progressing   Goal Outcome Evaluation:

## 2022-12-17 NOTE — L&D DELIVERY NOTE
[facility]   Vaginal Delivery Note    Patient Name: Tsering Kerr  : 1980  MRN: [id number]    Date of Delivery: 2022     Diagnosis     Pre & Post-Delivery:  Intrauterine pregnancy at 40w1d  Labor status: Induced Onset of Labor      (normal spontaneous vaginal delivery)             Problem List    Transfer to Postpartum     Review the Delivery Report for details.     Delivery     Delivery: Vaginal, Spontaneous     YOB: 2020    Time of Birth:  Gestational Age 10:56 AM   40w1d     Anesthesia: Epidural     Delivering clinician: Alice Johnson    Forceps?   No   Vacuum? No    Shoulder dystocia present: No        Delivery narrative:  Patient at 40w1d pushed to deliver a viable female infant over a perineal abrasion with epidural anesthesia. Infant's head delivered and a loose nuchal x1 was found and easily reduced over fetal head. The anterior shoulder and body delivered atraumatically with assist from patient's . Vigorous infant was placed on mom's abdomen where the cord was allowed to stop pulsating and was clamped x2 and cut by FOB. Placenta delivered spontaneously and appeared to be intact. Perineum was inspected and found to be intact. Perineal abrasion was not repaired as it was hemostatic. EBL 100ml. Mother and infant stable, bonding.           Infant     Findings: female  infant     Infant observations: Weight: 3835 g (8 lb 7.3 oz)   Length: 20  in  Observations/Comments:        Apgars:  8 @ 1 minute /     9 @ 5 minutes   Infant Name: Yvonne Malagon     Placenta & Cord         Placenta delivered  Spontaneous  at   2022 11:04 AM     Cord: 3 vessels  present.   Nuchal Cord?  yes; Number of nuchal loops present:  1    Cord blood obtained: Yes    Cord gases obtained:  No              Repair      Episiotomy: None         Lacerations: No   Estimated Blood Loss:       Quantitative Blood Loss:          Complications     none    Disposition     Mother to Mother  Baby/Postpartum  in stable condition currently.  Baby to remains with mom  in stable condition currently.    Alice Johnson CNM  12/17/22  11:15 EST

## 2022-12-17 NOTE — PLAN OF CARE
Problem: Bleeding (Labor)  Goal: Hemostasis  Outcome: Met     Problem: Change in Fetal Wellbeing (Labor)  Goal: Stable Fetal Wellbeing  Outcome: Met  Intervention: Promote and Monitor Fetal Wellbeing  Recent Flowsheet Documentation  Taken 12/17/2022 1110 by Lizbeth Early, RN  Body Position: sitting up in bed  Taken 12/17/2022 0915 by Lizbeth Early, RN  Body Position: side-lying  Taken 12/17/2022 0748 by Lizbeth Early, RN  Body Position: sitting up in bed     Problem: Delayed Labor Progression (Labor)  Goal: Effective Progression to Delivery  Outcome: Met     Problem: Infection (Labor)  Goal: Absence of Infection Signs and Symptoms  Outcome: Met     Problem: Labor Pain (Labor)  Goal: Acceptable Pain Control  Outcome: Met     Problem: Uterine Tachysystole (Labor)  Goal: Normal Uterine Contraction Pattern  Outcome: Met   Goal Outcome Evaluation:

## 2022-12-17 NOTE — ANESTHESIA PROCEDURE NOTES
Labor Epidural      Patient reassessed immediately prior to procedure    Patient location during procedure: OB  Start Time: 12/17/2022 8:30 AM  Stop Time: 12/17/2022 8:51 AM  Performed By  Anesthesiologist: Donavan Lizama MD  Preanesthetic Checklist  Completed: patient identified, IV checked, site marked, risks and benefits discussed, surgical consent, monitors and equipment checked, pre-op evaluation and timeout performed  Prep:  Pt Position:sitting  Sterile Tech:cap, gloves, mask and sterile barrier  Prep:DuraPrep  Monitoring:blood pressure monitoring  Epidural Block Procedure:  Approach:midline  Guidance:landmark technique  Location:L3-L4  Needle Type:Tuohy  Needle Gauge:17 G  Loss of Resistance Medium: air  Loss of Resistance: 5cm  Cath Depth at skin:15 cm  Paresthesia: none  Aspiration:negative  Test Dose:negative  Number of Attempts: 1  Post Assessment:  Dressing:occlusive dressing applied and secured with tape  Pt Tolerance:patient tolerated the procedure well with no apparent complications  Complications:no

## 2022-12-17 NOTE — LACTATION NOTE
12/17/22 [facility]   Maternal Information   Date of Referral 12/17/22   Person Making Referral lactation consultant   Maternal Reason for Referral other (see comments)  (Mom nursed first child 7-8 months.  States infant has latched and nursed well one time since delivery.  Infant is currently gagging/spitting.  Infant placed STS with mom.  Breastfeeding education provided.  Mom has spectra and medela pump at home.)   Maternal Assessment   Breast Size Issue none   Breast Shape Bilateral:;round   Breast Density Bilateral:;soft   Maternal Infant Feeding   Maternal Emotional State receptive;relaxed   Milk Expression/Equipment   Breast Pump Type double electric, personal

## 2022-12-17 NOTE — H&P
[facility]  Obstetric History and Physical    Chief Complaint   Patient presents with   • Scheduled Induction       Subjective     Patient is a 42 y.o. female  currently at 40w1d, who presents for induction of labor  for elective  with favorable cervix. She voices good fetal movement. She denies vaginal bleeding and leaking of fluid. She plans epidural for labor and birth.     Her prenatal care is complicated by  advanced maternal age  genetic screening was declined.  Her previous obstetric/gynecological history is non-contributory.    The following portions of the patients history were reviewed and updated as appropriate: current medications, allergies, past medical history, past surgical history, past family history, past social history and problem list .       Prenatal Information:   Maternal Prenatal Labs  Blood Type ABO Type   Date Value Ref Range Status   2022 A  Final      Rh Status RH type   Date Value Ref Range Status   2022 Positive  Final      Antibody Screen Antibody Screen   Date Value Ref Range Status   2022 Negative  Final      Gonnorhea No results found for: GCCX   Chlamydia No results found for: CLAMYDCU   RPR No results found for: RPR   Syphilis Antibody No results found for: SYPHILIS   Rubella No results found for: RUBELLAIGGIN   Hepatitis B Surface Antigen No results found for: HEPBSAG   HIV-1 Antibody No results found for: LABHIV1   Hepatitis C Antibody No results found for: HEPCAB   Rapid Urin Drug Screen No results found for: AMPMETHU, BARBITSCNUR, LABBENZSCN, LABMETHSCN, LABOPIASCN, THCURSCR, COCAINEUR, AMPHETSCREEN, PROPOXSCN, BUPRENORSCNU, METAMPSCNUR, OXYCODONESCN, TRICYCLICSCN   Group B Strep Culture Negative                 Past OB History:       OB History    Para Term  AB Living   3 1 1 0 1 1   SAB IAB Ectopic Molar Multiple Live Births   1 0 0 0 0 1      # Outcome Date GA Lbr Dennys/2nd Weight Sex Delivery Anes PTL Lv   3 Current            2  SAB 09/07/21    U SAB      1 Term 03/12/20 40w2d  3265 g (7 lb 3.2 oz) F Vag-Forceps EPI N ISIDORO      Complications: Fetal Intolerance      Name: MONCHO GALLARDO      Apgar1: 8  Apgar5: 9       Past Medical History: History reviewed. No pertinent past medical history.   Past Surgical History Past Surgical History:   Procedure Laterality Date   • CERVICAL BIOPSY  W/ LOOP ELECTRODE EXCISION     • DILATATION AND CURETTAGE     • WISDOM TOOTH EXTRACTION        Family History: Family History   Problem Relation Age of Onset   • Lung cancer Father    • Lymphoma Mother       Social History:  reports that she has never smoked. She has never used smokeless tobacco.   reports that she does not currently use alcohol.   reports no history of drug use.        REVIEW OF SYSTEMS             Reports fetal movement is normal             Denies leakage of amniotic fluid.             Denies vaginal bleeding             She reports Regular contractions, frequency: Every 2-4 minutes, intensity moderate             All other systems reviewed and are negative    Objective       Vital Signs Range for the last 24 hours  Temperature: Temp:  [97.8 °F (36.6 °C)-98.3 °F (36.8 °C)] 97.8 °F (36.6 °C)   Temp Source: Temp src: Oral   BP: BP: (129-141)/(77-85) 141/78   Pulse: Heart Rate:  [66-80] 80   Respirations: Resp:  [16-18] 16   SPO2:     O2 Amount (l/min):     O2 Devices     Weight: Weight:  [104 kg (229 lb)] 104 kg (229 lb)       Presentation: vertex   Cervix: Exam by:  MARGARET Johnson CNM   Dilation:  3-4   Effacement: Cervical Effacement: 80%   Station:  -2       Fetal Heart Rate Assessment   Method: Fetal HR Assessment Method: external   Beats/min: Fetal HR (beats/min): 130   Baseline: Fetal HR Baseline: normal range   Varibility: Fetal HR Variability: moderate (amplitude range 6 to 25 bpm)   Accels: Fetal HR Accelerations: greater than/equal to 15 bpm, lasting at least 15 seconds   Decels: Fetal HR Decelerations: absent   Tracing Category:   1    NST: REACTIVE     Uterine Assessment   Method: Method: external tocotransducer   Frequency (min): Contraction Frequency (Minutes): 2-3   Ctx Count in 10 min:     Duration:     Intensity: Contraction Intensity: moderate by palpation   Intensity by IUPC:     Resting Tone: Uterine Resting Tone: soft by palpation   Resting Tone by IUPC:     [place] Units:             Constitutional:  Well developed, well nourished, no acute distress, well-groomed.   Respiratory:  Resp e/e/u, normal breath sounds.   Cardiovascular:  Normal rate and rhythm, no murmurs.   Gastrointestinal:  Soft, gravid, nontender.  Uterus: Soft, nontender. Fundus appropriate for dates.  Neurologic:  Alert & oriented x 3,  no focal deficits noted.   Psychiatric:  Speech and behavior appropriate.   Extremities: no cyanosis, clubbing or edema, no evidence of DVT.        Labs:  Lab Results   Component Value Date    WBC 8.68 12/16/2022    HGB 11.3 (L) 12/16/2022    HCT 33.6 (L) 12/16/2022    MCV 86.6 12/16/2022     12/16/2022    URICACID 4.0 12/16/2022    AST 17 12/16/2022    ALT 11 12/16/2022     12/16/2022     Results from last 7 days   Lab Units 12/16/22 2233   ABO TYPING  A   RH TYPING  Positive   ANTIBODY SCREEN  Negative           Assessment & Plan       40 weeks gestation of pregnancy        Assessment:  1.  Intrauterine pregnancy at 40w1d weeks gestation with reactive fetal status.    2.   induction of labor  for elective  with favorable cervix   3.  AMA  4.  Obstetrical history is non-contributory.  5.  GBS status: Negative    Plan:  1.Oxytocin induction   2. AROM, clear fluid  3. Plan of care has been reviewed with patient and questions answered.  4.  Risks, benefits of treatment plan have been discussed.  5.  All questions have been answered.        Alice Johnson CNM  12/17/2022  07:54 EST

## 2022-12-18 LAB
BASOPHILS # BLD AUTO: 0.05 10*3/MM3 (ref 0–0.2)
BASOPHILS NFR BLD AUTO: 0.5 % (ref 0–1.5)
DEPRECATED RDW RBC AUTO: 44.7 FL (ref 37–54)
EOSINOPHIL # BLD AUTO: 0.11 10*3/MM3 (ref 0–0.4)
EOSINOPHIL NFR BLD AUTO: 1.1 % (ref 0.3–6.2)
ERYTHROCYTE [DISTWIDTH] IN BLOOD BY AUTOMATED COUNT: 14 % (ref 12.3–15.4)
HCT VFR BLD AUTO: 34.1 % (ref 34–46.6)
HGB BLD-MCNC: 11.3 G/DL (ref 12–15.9)
IMM GRANULOCYTES # BLD AUTO: 0.04 10*3/MM3 (ref 0–0.05)
IMM GRANULOCYTES NFR BLD AUTO: 0.4 % (ref 0–0.5)
LYMPHOCYTES # BLD AUTO: 2.23 10*3/MM3 (ref 0.7–3.1)
LYMPHOCYTES NFR BLD AUTO: 22.3 % (ref 19.6–45.3)
MCH RBC QN AUTO: 29.2 PG (ref 26.6–33)
MCHC RBC AUTO-ENTMCNC: 33.1 G/DL (ref 31.5–35.7)
MCV RBC AUTO: 88.1 FL (ref 79–97)
MONOCYTES # BLD AUTO: 0.58 10*3/MM3 (ref 0.1–0.9)
MONOCYTES NFR BLD AUTO: 5.8 % (ref 5–12)
NEUTROPHILS NFR BLD AUTO: 6.98 10*3/MM3 (ref 1.7–7)
NEUTROPHILS NFR BLD AUTO: 69.9 % (ref 42.7–76)
NRBC BLD AUTO-RTO: 0 /100 WBC (ref 0–0.2)
PLATELET # BLD AUTO: 223 10*3/MM3 (ref 140–450)
PMV BLD AUTO: 11 FL (ref 6–12)
RBC # BLD AUTO: 3.87 10*6/MM3 (ref 3.77–5.28)
WBC NRBC COR # BLD: 9.99 10*3/MM3 (ref 3.4–10.8)

## 2022-12-18 PROCEDURE — 85025 COMPLETE CBC W/AUTO DIFF WBC: CPT | Performed by: ADVANCED PRACTICE MIDWIFE

## 2022-12-18 RX ADMIN — IBUPROFEN 600 MG: 600 TABLET ORAL at 11:50

## 2022-12-18 RX ADMIN — IBUPROFEN 600 MG: 600 TABLET ORAL at 05:55

## 2022-12-18 RX ADMIN — IBUPROFEN 600 MG: 600 TABLET ORAL at 00:05

## 2022-12-18 RX ADMIN — IBUPROFEN 600 MG: 600 TABLET ORAL at 20:28

## 2022-12-18 RX ADMIN — DOCUSATE SODIUM 100 MG: 100 CAPSULE, LIQUID FILLED ORAL at 20:29

## 2022-12-18 RX ADMIN — PRENATAL VITAMINS-IRON FUMARATE 27 MG IRON-FOLIC ACID 0.8 MG TABLET 1 TABLET: at 08:18

## 2022-12-18 RX ADMIN — DOCUSATE SODIUM 100 MG: 100 CAPSULE, LIQUID FILLED ORAL at 08:18

## 2022-12-18 NOTE — PROGRESS NOTES
12/18/2022  PPD #1    Subjective   Tsering feels well.  Patient describes her lochia as less than menses.  Pain is well controlled  She is breastfeeding.      Objective   Temp: Temp:  [97.7 °F (36.5 °C)-98.6 °F (37 °C)] 98 °F (36.7 °C) Temp src: Axillary   BP: BP: (116-130)/(55-68) 130/55        Pulse: Heart Rate:  [73-77] 77  RR: Resp:  [16] 16    General:  No acute distress   Abdomen: Fundus firm and beneath umbilicus   Pelvis: deferred     Lab Results   Component Value Date    WBC 9.99 12/18/2022    HGB 11.3 (L) 12/18/2022    HCT 34.1 12/18/2022    MCV 88.1 12/18/2022     12/18/2022    URICACID 4.0 12/16/2022    AST 17 12/16/2022    ALT 11 12/16/2022     12/16/2022    HEPBSAG Non-Reactive 08/02/2019     Results from last 7 days   Lab Units 12/16/22  2233   ABO TYPING  A   RH TYPING  Positive   ANTIBODY SCREEN  Negative       Assessment  1. PPD# 1 after vaginal delivery    Plan  1. Supportive care, anticipate d/c in am.      This note has been electronically signed.    Alice Johnson CNM  12:59 EST  December 18, 2022

## 2022-12-18 NOTE — ANESTHESIA POSTPROCEDURE EVALUATION
Patient: Tsering Kerr    Procedure Summary     Date: 12/17/22 Room / Location:     Anesthesia Start: 0830 Anesthesia Stop: 1056    Procedure: LABOR ANALGESIA Diagnosis:     Scheduled Providers:  Provider: Donavan Lizama MD    Anesthesia Type: epidural ASA Status: 2 - Emergent          Anesthesia Type: epidural    Vitals  Vitals Value Taken Time   /55 12/18/22 0815   Temp 98 °F (36.7 °C) 12/18/22 0815   Pulse 77 12/18/22 0815   Resp 16 12/18/22 0815   SpO2             Post Anesthesia Care and Evaluation    Patient location during evaluation: bedside  Patient participation: complete - patient participated  Level of consciousness: awake  Pain score: 0  Pain management: satisfactory to patient    Airway patency: patent  Anesthetic complications: No anesthetic complications  PONV Status: none  Cardiovascular status: acceptable and hemodynamically stable  Respiratory status: acceptable  Hydration status: acceptable  Post Neuraxial Block status: Motor and sensory function returned to baseline and No signs or symptoms of PDPH

## 2022-12-19 VITALS
HEIGHT: 71 IN | BODY MASS INDEX: 32.06 KG/M2 | HEART RATE: 69 BPM | RESPIRATION RATE: 18 BRPM | WEIGHT: 229 LBS | SYSTOLIC BLOOD PRESSURE: 130 MMHG | DIASTOLIC BLOOD PRESSURE: 69 MMHG | TEMPERATURE: 98.1 F

## 2022-12-19 PROCEDURE — 90707 MMR VACCINE SC: CPT | Performed by: ADVANCED PRACTICE MIDWIFE

## 2022-12-19 PROCEDURE — 25010000002 MEASLES, MUMPS & RUBELLA VAC RECONSTITUTED SOLUTION: Performed by: ADVANCED PRACTICE MIDWIFE

## 2022-12-19 PROCEDURE — 90471 IMMUNIZATION ADMIN: CPT | Performed by: ADVANCED PRACTICE MIDWIFE

## 2022-12-19 RX ORDER — IBUPROFEN 600 MG/1
600 TABLET ORAL EVERY 6 HOURS PRN
Qty: 60 TABLET | Refills: 1 | Status: SHIPPED | OUTPATIENT
Start: 2022-12-19

## 2022-12-19 RX ADMIN — MEASLES, MUMPS, AND RUBELLA VIRUS VACCINE LIVE 0.5 ML: 1000; 12500; 1000 INJECTION, POWDER, LYOPHILIZED, FOR SUSPENSION SUBCUTANEOUS at 12:45

## 2022-12-19 RX ADMIN — DOCUSATE SODIUM 100 MG: 100 CAPSULE, LIQUID FILLED ORAL at 07:57

## 2022-12-19 RX ADMIN — IBUPROFEN 600 MG: 600 TABLET ORAL at 07:58

## 2022-12-19 RX ADMIN — PRENATAL VITAMINS-IRON FUMARATE 27 MG IRON-FOLIC ACID 0.8 MG TABLET 1 TABLET: at 07:57

## 2022-12-19 NOTE — DISCHARGE SUMMARY
[facility]  Delivery Discharge Summary    Primary OB Clinician:     EDC: Estimated Date of Delivery: 22    Gestational Age:40w1d    Date of Admission: 2022    Admission Diagnosis:      Discharge Diagnosis: Same    Date of Delivery: 2022   Time of Delivery: 10:56 AM     Delivered By:  Alice Johnson     Delivery Type: Vaginal, Spontaneous          Baby:female  infant;   Apgar:  8  @ 1 minute /   Apgar:  9  @ 5 minutes   Weight: 3835 g (8 lb 7.3 oz)    Length: 20     Anesthesia: Epidural      Laceration: No    Exam:  Normal postpartum exam    Hospital Course:  Hospital course has been stable.  Patient is tolerating po, voiding without difficulty and ready for discharge.  Please see medication reconciliation and lab report for additional details.      Follow Up:  Patient has been given a follow up appointment in our office.     Discharge Date: 2022; Discharge Time: 13:10 EST    Batsheva Denton CNM   13:10 EST   22

## 2022-12-19 NOTE — PROGRESS NOTES
[facility]  Vaginal Delivery Progress Note    Subjective     PPD#2 . Feeling well. Tolerating regular diet. Voiding without difficulty. Pain controlled. Decreasing lochia. Ambulating. VSS. Afebrile. PP h/h stable.       Objective     Vital Signs Range for the last 24 hours  Temperature: Temp:  [97.8 °F (36.6 °C)-98.1 °F (36.7 °C)] 98.1 °F (36.7 °C)   Temp Source: Temp src: Oral   BP: BP: (118-130)/(69-75) 130/69   Pulse: Heart Rate:  [63-77] 69   Respirations: Resp:  [16-18] 18   SPO2:     O2 Amount (l/min):     O2 Devices           Physical Exam:  General:  no acute distresss.  Abdomen: Soft, non-tender, fundus firm  Extremities: normal, atraumatic, no cyanosis, and trace edema.       Lab results reviewed:  Yes    Lab Results   Component Value Date    WBC 9.99 2022    HGB 11.3 (L) 2022    HCT 34.1 2022    MCV 88.1 2022     2022         Assessment & Plan        (normal spontaneous vaginal delivery)      Tsering Ortiz Kerr is Day 2  post-partum       Plan:  Discharge home with standard precautions and return to clinic in 4-6 weeks.      Batsheva Denton CNM  2022  13:08 EST

## 2024-02-13 ENCOUNTER — TRANSCRIBE ORDERS (OUTPATIENT)
Dept: ADMINISTRATIVE | Facility: HOSPITAL | Age: 44
End: 2024-02-13
Payer: COMMERCIAL

## 2024-02-13 DIAGNOSIS — Z12.31 VISIT FOR SCREENING MAMMOGRAM: Primary | ICD-10-CM

## 2024-03-19 ENCOUNTER — HOSPITAL ENCOUNTER (OUTPATIENT)
Dept: MAMMOGRAPHY | Facility: HOSPITAL | Age: 44
Discharge: HOME | End: 2024-03-19
Admitting: ADVANCED PRACTICE MIDWIFE
Payer: COMMERCIAL

## 2024-03-19 DIAGNOSIS — Z12.31 VISIT FOR SCREENING MAMMOGRAM: ICD-10-CM

## 2024-03-19 PROCEDURE — 77067 SCR MAMMO BI INCL CAD: CPT

## 2024-03-19 PROCEDURE — 77063 BREAST TOMOSYNTHESIS BI: CPT

## 2024-03-20 PROCEDURE — 77063 BREAST TOMOSYNTHESIS BI: CPT | Performed by: RADIOLOGY

## 2024-03-20 PROCEDURE — 77067 SCR MAMMO BI INCL CAD: CPT | Performed by: RADIOLOGY

## 2025-05-31 NOTE — LACTATION NOTE
Continue montelukast, as needed albuterol    Orders:    albuterol sulfate HFA (PROVENTIL;VENTOLIN;PROAIR) 108 (90 Base) MCG/ACT inhaler; Inhale 1 puff into the lungs every 4 hours as needed for Shortness of Breath or Wheezing     Mom reports infant is latching and nursing well.  States infant is still spitty but she has no concerns regarding feeds.  Encouraged mom to call for assistance as needed.

## 2025-06-03 ENCOUNTER — TRANSCRIBE ORDERS (OUTPATIENT)
Dept: ADMINISTRATIVE | Facility: HOSPITAL | Age: 45
End: 2025-06-03
Payer: COMMERCIAL

## 2025-06-03 DIAGNOSIS — Z12.31 SCREENING MAMMOGRAM FOR BREAST CANCER: Primary | ICD-10-CM

## 2025-06-16 LAB
NCCN CRITERIA FLAG: NORMAL
TYRER CUZICK SCORE: 13.9

## 2025-06-21 ENCOUNTER — HOSPITAL ENCOUNTER (OUTPATIENT)
Dept: MAMMOGRAPHY | Facility: HOSPITAL | Age: 45
Discharge: HOME | End: 2025-06-21
Admitting: ADVANCED PRACTICE MIDWIFE
Payer: COMMERCIAL

## 2025-06-21 DIAGNOSIS — Z12.31 SCREENING MAMMOGRAM FOR BREAST CANCER: ICD-10-CM

## 2025-06-21 PROCEDURE — 77067 SCR MAMMO BI INCL CAD: CPT

## 2025-06-21 PROCEDURE — 77063 BREAST TOMOSYNTHESIS BI: CPT
